# Patient Record
Sex: FEMALE | Race: WHITE | Employment: OTHER | URBAN - METROPOLITAN AREA
[De-identification: names, ages, dates, MRNs, and addresses within clinical notes are randomized per-mention and may not be internally consistent; named-entity substitution may affect disease eponyms.]

---

## 2017-03-02 DIAGNOSIS — I10 HTN, GOAL BELOW 140/90: ICD-10-CM

## 2017-03-02 DIAGNOSIS — R00.2 PALPITATIONS: ICD-10-CM

## 2017-03-02 NOTE — TELEPHONE ENCOUNTER
Metoprolol 25 mg      Last Written Prescription Date: 12/12/2016  Last Fill Quantity: 90, # refills: 0    Last Office Visit with [facility], [facility], [facility] [place] or [facility] prescribing provider:  12/13/2016   Future Office Visit:        BP Readings from Last 3 Encounters:   11/18/16 140/84   02/23/16 114/78   10/23/15 120/76       Losartan 50 mg      Last Written Prescription Date: 12/13/2016  Last Fill Quantity: 90, # refills: 0  Last Office Visit with [facility], [facility] [place] or [facility] prescribing provider: 11/18/2016       Potassium   Date Value Ref Range Status   11/18/2016 4.7 3.4 - 5.3 mmol/L Final     Creatinine   Date Value Ref Range Status   11/18/2016 0.97 0.52 - 1.04 mg/dL Final     BP Readings from Last 3 Encounters:   11/18/16 140/84   02/23/16 114/78   10/23/15 120/76

## 2017-03-03 RX ORDER — METOPROLOL SUCCINATE 25 MG/1
TABLET, EXTENDED RELEASE ORAL
Qty: 90 TABLET | Refills: 2 | Status: SHIPPED | OUTPATIENT
Start: 2017-03-03 | End: 2018-01-04

## 2017-03-03 RX ORDER — LOSARTAN POTASSIUM 50 MG/1
TABLET ORAL
Qty: 90 TABLET | Refills: 2 | Status: SHIPPED | OUTPATIENT
Start: 2017-03-03 | End: 2018-01-04

## 2017-03-03 NOTE — TELEPHONE ENCOUNTER
Prescription approved per [facility] Refill Protocol.  Sandra Cano, RN, BSN

## 2017-09-27 DIAGNOSIS — R00.2 PALPITATIONS: ICD-10-CM

## 2017-09-27 DIAGNOSIS — I10 HTN, GOAL BELOW 140/90: ICD-10-CM

## 2017-09-27 NOTE — TELEPHONE ENCOUNTER
Pending Prescriptions:                       Disp   Refills    metoprolol (TOPROL-XL) 25 MG 24 hr tablet*90 tab*2            Sig: TAKE 1 TABLET EVERY DAY    losartan (COZAAR) 50 MG tablet [Pharmacy *90 tab*2            Sig: TAKE 1 TABLET EVERY DAY          Last Written Prescription Date: 3/3/2017  Last Fill Quantity: 90, # refills: 2    Last Office Visit with [facility], [facility] or [facility] prescribing provider:  11/18/2016   Future Office Visit:        BP Readings from Last 3 Encounters:   11/18/16 140/84   02/23/16 114/78   10/23/15 120/76     Losartan 50mg      Last Written Prescription Date: 3/3/2017  Last Fill Quantity: 90, # refills: 2  Last Office Visit with [facility], [facility] or [facility] prescribing provider: 11/18/2016       Potassium   Date Value Ref Range Status   11/18/2016 4.7 3.4 - 5.3 mmol/L Final     Creatinine   Date Value Ref Range Status   11/18/2016 0.97 0.52 - 1.04 mg/dL Final     BP Readings from Last 3 Encounters:   11/18/16 140/84   02/23/16 114/78   10/23/15 120/76

## 2017-09-28 RX ORDER — LOSARTAN POTASSIUM 50 MG/1
TABLET ORAL
Qty: 90 TABLET | Refills: 2 | OUTPATIENT
Start: 2017-09-28

## 2017-09-28 RX ORDER — METOPROLOL SUCCINATE 25 MG/1
TABLET, EXTENDED RELEASE ORAL
Qty: 90 TABLET | Refills: 2 | OUTPATIENT
Start: 2017-09-28

## 2017-09-28 NOTE — TELEPHONE ENCOUNTER
Metoprolol  rx was filled on 3/3/2017 for a 3 month supply with 2 additional refills.  Pt should have enough through December.    Losartan  rx was filled on 3/3/2017 for a 3 month supply with 2 additional refills.  Pt should have enough through December.    Michelle Cotter RN

## 2017-10-23 NOTE — PROGRESS NOTES
SUBJECTIVE:                                                    Colleen Rowan is a 83 year old female who presents to clinic today for the following health issues:      HPI        Hospital Follow-up Visit:    Hospital/Nursing Home/ Rehab Facility: [facility]  Date of Admission: 10/18/17  Date of Discharge: 10/21/17  Reason(s) for Admission: Melena            Problems taking medications regularly:  None       Medication changes since discharge: Started: Pantoprazole        Problems adhering to non-medication therapy:  None    Summary of hospitalization:  CareEverywhere information obtained and reviewed  Diagnostic Tests/Treatments reviewed.  Follow up needed: none  Other Healthcare Providers Involved in Patient s Care:         None  Update since discharge: improved.     Post Discharge Medication Reconciliation: discharge medications reconciled, continue medications without change.  Plan of care communicated with patient     Coding guidelines for this visit:  Type of Medical   Decision Making Face-to-Face Visit       within 7 Days of discharge Face-to-Face Visit        within 14 days of discharge   Moderate Complexity [id number]   High Complexity [id number]              Problem list and histories reviewed & adjusted, as indicated.  Additional history: as documented        Patient Active Problem List   Diagnosis     Disorder of bone and cartilage     Ganglion     Senile osteoporosis     Advanced directives, counseling/discussion     CAD (coronary artery disease): abn EKG, with history of chest pain ca 1995     HTN, goal below 140/90     Respiratory abnormality     Back pain     Insomnia     Aneurysm of thoracic aorta (H)     Cystocele     Gastrointestinal hemorrhage associated with gastric ulcer     Past Surgical History:   Procedure Laterality Date     C LIGATE FALLOPIAN TUBE  age 22    tubal ligation     FUSION LUMBAR POSTERIOR ARTIFICIAL DISK WITH SYSTEM  08/14/2012    L-1,[facility]     HC EXCISION  TUMOR SOFT TISSUE NECK/THORAX IM <5CM  12/30/2003    Excision of intramuscular mass, right posterolateral chest.       Social History   Substance Use Topics     Smoking status: Never Smoker     Smokeless tobacco: Never Used     Alcohol use No     Family History   Problem Relation Age of Onset     Allergies Sister      latex     Eye Disorder Sister      cataracts     Arthritis Mother      Hypertension Mother      CEREBROVASCULAR DISEASE Mother 94     Eye Disorder Mother      cataracts     OSTEOPOROSIS Mother      kyphosis     CANCER Father      prostate     CEREBROVASCULAR DISEASE Father 80     Prostate Cancer Father      Depression Father      Neurologic Disorder Father      Parkinson's     CANCER Maternal Grandmother      uterine ca, and pelvic bone cancer     CEREBROVASCULAR DISEASE Maternal Grandmother      CEREBROVASCULAR DISEASE Maternal Grandfather      Asthma Daughter      Thyroid Disease Daughter      CANCER Son 48     non hodgkins lymphoma     C.A.D. No family hx of      DIABETES No family hx of      Breast Cancer No family hx of      Cancer - colorectal No family hx of      Alzheimer Disease No family hx of      Cardiovascular No family hx of      HEART DISEASE No family hx of      Circulatory No family hx of      GASTROINTESTINAL DISEASE No family hx of      Genitourinary Problems No family hx of      Lipids No family hx of      Musculoskeletal Disorder No family hx of      Respiratory No family hx of          Current Outpatient Prescriptions   Medication Sig Dispense Refill     traZODone (DESYREL) 50 MG tablet Take 1 tablet (50 mg) by mouth nightly as needed for sleep 30 tablet 1     pantoprazole (PROTONIX) 40 MG EC tablet Take 2 tablets (80 mg) by mouth daily 60 tablet 2     metoprolol (TOPROL-XL) 25 MG 24 hr tablet TAKE 1 TABLET EVERY DAY 90 tablet 2     losartan (COZAAR) 50 MG tablet TAKE 1 TABLET EVERY DAY 90 tablet 2     VITAMIN E NATURAL PO Take 400 Units by mouth daily       Multiple  "Vitamins-Minerals (OCUVITE PO) Take  by mouth.       calcium citrate (CALCITRATE) 950 MG tablet Take 1 tablet by mouth 2 times daily.       Ascorbic Acid (VITAMIN C PO) Take 1 tablet by mouth daily.       Cholecalciferol (VITAMIN D PO) Take  by mouth.       aspirin 81 MG EC tablet Take 1 tablet by mouth daily. (Patient not taking: Reported on 10/24/2017) 1 tablet 0     Allergies   Allergen Reactions     Penicillins      hives     BP Readings from Last 3 Encounters:   10/24/17 120/72   11/18/16 140/84   02/23/16 114/78    Wt Readings from Last 3 Encounters:   10/24/17 124 lb (56.2 kg)   11/18/16 127 lb (57.6 kg)   02/23/16 125 lb 9.6 oz (57 kg)                  Labs reviewed in EPIC        ROS:  Constitutional, HEENT, cardiovascular, pulmonary, GI, , musculoskeletal, neuro, skin, endocrine and psych systems are negative, except as otherwise noted.      OBJECTIVE:   /72 (BP Location: Left arm, Patient Position: Chair, Cuff Size: Adult Regular)  Pulse 96  Temp 97.7  F (36.5  C) (Oral)  Resp 16  Ht 5' 1.5\" (1.562 m)  Wt 124 lb (56.2 kg)  SpO2 98%  BMI 23.05 kg/m2  Body mass index is 23.05 kg/(m^2).   Physical Exam   Constitutional: She appears well-developed and well-nourished.   HENT:   Head: Normocephalic and atraumatic.   Cardiovascular: Normal rate, regular rhythm, normal heart sounds and intact distal pulses.    Pulmonary/Chest: Effort normal and breath sounds normal. No respiratory distress. She has no wheezes. She has no rales. She exhibits no tenderness.   Abdominal: Soft. Bowel sounds are normal. She exhibits no distension and no mass. There is no tenderness. There is no rebound and no guarding.   Neurological: She is alert.   Psychiatric: She has a normal mood and affect.         Diagnostic Test Results:  none     ASSESSMENT/PLAN:     Problem List Items Addressed This Visit     Insomnia     Has been using diphenhydramine for sleep and also has been taking some ibuprofen along with which could " have led to the ulcers  She wishes to try something else for insomnia  Trial trazodone         Relevant Medications    traZODone (DESYREL) 50 MG tablet    Aneurysm of thoracic aorta (H)     Saw CT surgeon in March 2017. Needs follow up in 2 yrs         Gastrointestinal hemorrhage associated with gastric ulcer - Primary     Hold aspirin for now  Continue take Pantoprazole for the next 3 months  Has endoscopy scheduled in next 2 months  No more melena.  Follow up in 3 months or sooner if any new symptoms         Relevant Medications    pantoprazole (PROTONIX) 40 MG EC tablet      Other Visit Diagnoses     Need for prophylactic vaccination and inoculation against influenza        Need for prophylactic vaccination against Streptococcus pneumoniae (pneumococcus)        Benign essential hypertension        Relevant Orders    COMPREHENSIVE METABOLIC PANEL       declines any shots       Heidi Inman MD  [facility]

## 2017-10-24 ENCOUNTER — OFFICE VISIT (OUTPATIENT)
Dept: FAMILY MEDICINE | Facility: OTHER | Age: 82
End: 2017-10-24
Payer: COMMERCIAL

## 2017-10-24 VITALS
TEMPERATURE: 97.7 F | OXYGEN SATURATION: 98 % | HEART RATE: 96 BPM | HEIGHT: 62 IN | SYSTOLIC BLOOD PRESSURE: 120 MMHG | DIASTOLIC BLOOD PRESSURE: 72 MMHG | BODY MASS INDEX: 22.82 KG/M2 | WEIGHT: 124 LBS | RESPIRATION RATE: 16 BRPM

## 2017-10-24 DIAGNOSIS — I25.10 CORONARY ARTERY DISEASE INVOLVING NATIVE HEART WITHOUT ANGINA PECTORIS, UNSPECIFIED VESSEL OR LESION TYPE: ICD-10-CM

## 2017-10-24 DIAGNOSIS — K25.4 GASTROINTESTINAL HEMORRHAGE ASSOCIATED WITH GASTRIC ULCER: Primary | ICD-10-CM

## 2017-10-24 DIAGNOSIS — I10 BENIGN ESSENTIAL HYPERTENSION: ICD-10-CM

## 2017-10-24 DIAGNOSIS — I71.20 THORACIC AORTIC ANEURYSM WITHOUT RUPTURE (H): ICD-10-CM

## 2017-10-24 DIAGNOSIS — I10 HTN, GOAL BELOW 140/90: ICD-10-CM

## 2017-10-24 DIAGNOSIS — G47.00 INSOMNIA, UNSPECIFIED TYPE: ICD-10-CM

## 2017-10-24 PROCEDURE — 99214 OFFICE O/P EST MOD 30 MIN: CPT | Performed by: FAMILY MEDICINE

## 2017-10-24 RX ORDER — PANTOPRAZOLE SODIUM 40 MG/1
2 TABLET, DELAYED RELEASE ORAL DAILY
COMMUNITY
Start: 2017-10-21 | End: 2017-10-24

## 2017-10-24 RX ORDER — TRAZODONE HYDROCHLORIDE 50 MG/1
50 TABLET, FILM COATED ORAL
Qty: 30 TABLET | Refills: 1 | Status: SHIPPED | OUTPATIENT
Start: 2017-10-24 | End: 2019-02-13

## 2017-10-24 RX ORDER — PANTOPRAZOLE SODIUM 40 MG/1
80 TABLET, DELAYED RELEASE ORAL DAILY
Qty: 60 TABLET | Refills: 2 | Status: SHIPPED | OUTPATIENT
Start: 2017-10-24 | End: 2018-05-01

## 2017-10-24 ASSESSMENT — PAIN SCALES - GENERAL: PAINLEVEL: NO PAIN (0)

## 2017-10-24 NOTE — NURSING NOTE
"Chief Complaint   Patient presents with     Hospital F/U     Health Maintenance     mychart, pneumovax, flu, height, fall risk       Initial /72 (BP Location: Left arm, Patient Position: Chair, Cuff Size: Adult Regular)  Pulse 96  Temp 97.7  F (36.5  C) (Oral)  Resp 16  Ht 5' 1.5\" (1.562 m)  Wt 124 lb (56.2 kg)  SpO2 98%  BMI 23.05 kg/m2 Estimated body mass index is 23.05 kg/(m^2) as calculated from the following:    Height as of this encounter: 5' 1.5\" (1.562 m).    Weight as of this encounter: 124 lb (56.2 kg).  Medication Reconciliation: complete   Chastity Pierce CMA (AAMA)      "

## 2017-10-24 NOTE — ASSESSMENT & PLAN NOTE
Has been using diphenhydramine for sleep and also has been taking some ibuprofen along with which could have led to the ulcers  She wishes to try something else for insomnia  Trial trazodone

## 2017-10-24 NOTE — ASSESSMENT & PLAN NOTE
Hold aspirin for now  Continue take Pantoprazole for the next 3 months  Has endoscopy scheduled in next 2 months  No more melena.  Follow up in 3 months or sooner if any new symptoms

## 2017-10-24 NOTE — MR AVS SNAPSHOT
"              After Visit Summary   10/24/2017    Colleen Rowan    MRN: [id number]           Patient Information     Date Of Birth          1/6/1934        Visit Information        Provider Department      10/24/2017 2:00 PM Heidi Inman MD [facility]        Today's Diagnoses     Gastrointestinal hemorrhage associated with gastric ulcer    -  1    Insomnia, unspecified type        Benign essential hypertension        Thoracic aortic aneurysm without rupture (H)        HTN, goal below 140/90        Coronary artery disease involving native heart without angina pectoris, unspecified vessel or lesion type           Follow-ups after your visit        Who to contact     If you have questions or need follow up information about today's clinic visit or your schedule please contact [facility] directly at [phone].  Normal or non-critical lab and imaging results will be communicated to you by MyChart, letter or phone within 4 business days after the clinic has received the results. If you do not hear from us within 7 days, please contact the clinic through MyChart or phone. If you have a critical or abnormal lab result, we will notify you by phone as soon as possible.  Submit refill requests through [employer] or call your pharmacy and they will forward the refill request to us. Please allow 3 business days for your refill to be completed.          Additional Information About Your Visit        [employer]ift[employer]ar[employer]elaGet Information     [employer] lets you send messages to your doctor, view your test results, renew your prescriptions, schedule appointments and more. To sign up, go to [email or web address] . Click on \"Log in\" on the left side of the screen, which will take you to the Welcome page. Then click on \"Sign up Now\" on the right side of the page.     You will be asked to enter the access code listed below, as well as some personal information. Please follow the directions to create your username " "and password.     Your access code is: [email or web address]  Expires: 2018  3:04 PM     Your access code will  in 90 days. If you need help or a new code, please call your [facility] clinic or [phone].        Care EveryWhere ID     This is your Care EveryWhere ID. This could be used by other organizations to access your [facility] medical records  [id number]        Your Vitals Were     Pulse Temperature Respirations Height Pulse Oximetry BMI (Body Mass Index)    96 97.7  F (36.5  C) (Oral) 16 5' 1.5\" (1.562 m) 98% 23.05 kg/m2       Blood Pressure from Last 3 Encounters:   10/24/17 120/72   16 140/84   16 114/78    Weight from Last 3 Encounters:   10/24/17 124 lb (56.2 kg)   16 127 lb (57.6 kg)   16 125 lb 9.6 oz (57 kg)              We Performed the Following     COMPREHENSIVE METABOLIC PANEL          Today's Medication Changes          These changes are accurate as of: 10/24/17  3:04 PM.  If you have any questions, ask your nurse or doctor.               Start taking these medicines.        Dose/Directions    traZODone 50 MG tablet   Commonly known as:  DESYREL   Used for:  Insomnia, unspecified type   Started by:  Heidi Inman MD        Dose:  50 mg   Take 1 tablet (50 mg) by mouth nightly as needed for sleep   Quantity:  30 tablet   Refills:  1            Where to get your medicines      These medications were sent to [facility] [address] [place] [id number] [address] [address]     Phone:  [phone]     pantoprazole 40 MG EC tablet    traZODone 50 MG tablet                Primary Care Provider Office Phone # Fax #    Heidi Inman [phone]       [address] [place] [id number]        Equal Access to Services     [facility] HospitalANNETTE AH: Ninfa shrestha Sorochelle, waaxda luqadaha, qaybta simón li. [facility] [phone].    ATENCIÓN: Si blanco coto a cheung " disposición servicios gratuitos de asistencia lingüística. Lisa gregg [phone].    We comply with applicable federal civil rights laws and [place] laws. We do not discriminate on the basis of race, color, national origin, age, disability, sex, sexual orientation, or gender identity.            Thank you!     Thank you for choosing [facility]  for your care. Our goal is always to provide you with excellent care. Hearing back from our patients is one way we can continue to improve our services. Please take a few minutes to complete the written survey that you may receive in the mail after your visit with us. Thank you!             Your Updated Medication List - Protect others around you: Learn how to safely use, store and throw away your medicines at www.disposemymeds.org.          This list is accurate as of: 10/24/17  3:04 PM.  Always use your most recent med list.                   Brand Name Dispense Instructions for use Diagnosis    aspirin 81 MG EC tablet     1 tablet    Take 1 tablet by mouth daily.        calcium citrate 950 MG tablet    CALCITRATE     Take 1 tablet by mouth 2 times daily.        losartan 50 MG tablet    COZAAR    90 tablet    TAKE 1 TABLET EVERY DAY    HTN, goal below 140/90       metoprolol 25 MG 24 hr tablet    TOPROL-XL    90 tablet    TAKE 1 TABLET EVERY DAY    Palpitations       OCUVITE PO      Take  by mouth.        pantoprazole 40 MG EC tablet    PROTONIX    60 tablet    Take 2 tablets (80 mg) by mouth daily    Gastrointestinal hemorrhage associated with gastric ulcer       traZODone 50 MG tablet    DESYREL    30 tablet    Take 1 tablet (50 mg) by mouth nightly as needed for sleep    Insomnia, unspecified type       VITAMIN C PO      Take 1 tablet by mouth daily.        VITAMIN D PO      Take  by mouth.        VITAMIN E NATURAL PO      Take 400 Units by mouth daily

## 2017-12-28 ENCOUNTER — TRANSFERRED RECORDS (OUTPATIENT)
Dept: HEALTH INFORMATION MANAGEMENT | Facility: CLINIC | Age: 82
End: 2017-12-28

## 2018-01-04 DIAGNOSIS — R00.2 PALPITATIONS: ICD-10-CM

## 2018-01-04 DIAGNOSIS — I10 HTN, GOAL BELOW 140/90: ICD-10-CM

## 2018-01-05 RX ORDER — METOPROLOL SUCCINATE 25 MG/1
TABLET, EXTENDED RELEASE ORAL
Qty: 90 TABLET | Refills: 2 | Status: SHIPPED | OUTPATIENT
Start: 2018-01-05 | End: 2018-08-06

## 2018-01-05 RX ORDER — LOSARTAN POTASSIUM 50 MG/1
TABLET ORAL
Qty: 30 TABLET | Refills: 0 | Status: SHIPPED | OUTPATIENT
Start: 2018-01-05 | End: 2018-01-19

## 2018-01-05 NOTE — TELEPHONE ENCOUNTER
Requested Prescriptions   Pending Prescriptions Disp Refills     metoprolol (TOPROL-XL) 25 MG 24 hr tablet [Pharmacy Med Name: METOPROLOL SUCCINATE ER 25 MG Tablet Extended Release 24 Hour] 90 tablet 2     Sig: TAKE 1 TABLET EVERY DAY    Beta-Blockers Protocol Passed    1/4/2018  1:38 PM       Passed - Blood pressure under 140/90    BP Readings from Last 3 Encounters:   10/24/17 120/72   11/18/16 140/84   02/23/16 114/78                Passed - Patient is age 6 or older       Passed - Recent or future visit with authorizing provider's specialty    Patient had office visit in the last year or has a visit in the next 30 days with authorizing provider.  See chart review.     Last OV: 10/24/17         Prescription approved per [facility] Refill Protocol.    Yamila Aggarwal RN, BSN               losartan (COZAAR) 50 MG tablet [Pharmacy Med Name: LOSARTAN POTASSIUM 50 MG Tablet] 90 tablet 2     Sig: TAKE 1 TABLET EVERY DAY    Angiotensin-II Receptors Failed    1/4/2018  1:38 PM       Failed - Normal serum creatinine on file in past 12 months    Recent Labs   Lab Test  11/18/16   1011   CR  0.97            Failed - Normal serum potassium on file in past 12 months    Recent Labs   Lab Test  11/18/16   1011   POTASSIUM  4.7                   Passed - Blood pressure under 140/90 in past 12 months.    BP Readings from Last 3 Encounters:   10/24/17 120/72   11/18/16 140/84   02/23/16 114/78                Passed - Recent or future visit with authorizing provider's specialty    Patient had office visit in the last year or has a visit in the next 30 days with authorizing provider.  See chart review.     Last OV: 10/24/17         Passed - Patient is age 18 or older       Passed - No active pregnancy on record       Passed - No positive pregnancy test in past 12 months        Routing refill request to provider for review/approval because:  Labs not current:  Serum potassium and serum creatinine    Yamila Aggarwal RN, BSN

## 2018-01-11 DIAGNOSIS — I10 HTN, GOAL BELOW 140/90: ICD-10-CM

## 2018-01-15 RX ORDER — LOSARTAN POTASSIUM 50 MG/1
TABLET ORAL
Qty: 90 TABLET | Refills: 2 | OUTPATIENT
Start: 2018-01-15

## 2018-01-15 NOTE — TELEPHONE ENCOUNTER
Routing refill request to provider for review/approval because:  Keyla given x1 and patient did not follow up, please advise  Per Dr. Healy on 1/5/18: Patient needs an appointment before further refills    Michelle Cotter RN

## 2018-01-16 NOTE — TELEPHONE ENCOUNTER
Notified pt, she will call back and schedule, she wants to make sure the weather is better before scheduling.

## 2018-01-17 NOTE — PROGRESS NOTES
SUBJECTIVE:                                                    Colleen Rowan is a 84 year old female who presents to clinic today for the following health issues:      History of Present Illness     Diet:  Regular (no restrictions)  Frequency of exercise:  6-7 days/week  Duration of exercise:  Greater than 60 minutes  Taking medications regularly:  Yes  Medication side effects:  None  Additional concerns today:  No    Answers for HPI/ROS submitted by the patient on 1/19/2018   PHQ-2 Score: 0    Hypertension Follow-up      Outpatient blood pressures are not being checked.    Low Salt Diet: no added salt    She is also here to discuss results of her recent endoscopy done in December 2017      Problem list and histories reviewed & adjusted, as indicated.  Additional history: as documented        Patient Active Problem List   Diagnosis     Disorder of bone and cartilage     Ganglion     Senile osteoporosis     Advanced directives, counseling/discussion     CAD (coronary artery disease): abn EKG, with history of chest pain ca 1995     HTN, goal below 140/90     Respiratory abnormality     Back pain     Insomnia     Aneurysm of thoracic aorta (H)     Cystocele     Gastrointestinal hemorrhage associated with gastric ulcer     Past Surgical History:   Procedure Laterality Date     C LIGATE FALLOPIAN TUBE  age 22    tubal ligation     FUSION LUMBAR POSTERIOR ARTIFICIAL DISK WITH SYSTEM  08/14/2012    L-1,[facility] EXCISION TUMOR SOFT TISSUE NECK/THORAX IM <5CM  12/30/2003    Excision of intramuscular mass, right posterolateral chest.       Social History   Substance Use Topics     Smoking status: Never Smoker     Smokeless tobacco: Never Used     Alcohol use No     Family History   Problem Relation Age of Onset     Allergies Sister      latex     Eye Disorder Sister      cataracts     Arthritis Mother      Hypertension Mother      CEREBROVASCULAR DISEASE Mother 94     Eye Disorder Mother      cataracts      OSTEOPOROSIS Mother      kyphosis     CANCER Father      prostate     CEREBROVASCULAR DISEASE Father 80     Prostate Cancer Father      Depression Father      Neurologic Disorder Father      Parkinson's     CANCER Maternal Grandmother      uterine ca, and pelvic bone cancer     CEREBROVASCULAR DISEASE Maternal Grandmother      CEREBROVASCULAR DISEASE Maternal Grandfather      Asthma Daughter      Thyroid Disease Daughter      CANCER Son 48     non hodgkins lymphoma     C.A.D. No family hx of      DIABETES No family hx of      Breast Cancer No family hx of      Cancer - colorectal No family hx of      Alzheimer Disease No family hx of      Cardiovascular No family hx of      HEART DISEASE No family hx of      Circulatory No family hx of      GASTROINTESTINAL DISEASE No family hx of      Genitourinary Problems No family hx of      Lipids No family hx of      Musculoskeletal Disorder No family hx of      Respiratory No family hx of          Current Outpatient Prescriptions   Medication Sig Dispense Refill     omeprazole (PRILOSEC) 20 MG CR capsule Take 1 capsule by mouth daily       losartan (COZAAR) 50 MG tablet Take 1 tablet (50 mg) by mouth daily 90 tablet 3     metoprolol (TOPROL-XL) 25 MG 24 hr tablet TAKE 1 TABLET EVERY DAY 90 tablet 2     traZODone (DESYREL) 50 MG tablet Take 1 tablet (50 mg) by mouth nightly as needed for sleep 30 tablet 1     VITAMIN E NATURAL PO Take 400 Units by mouth daily       Multiple Vitamins-Minerals (OCUVITE PO) Take  by mouth.       calcium citrate (CALCITRATE) 950 MG tablet Take 1 tablet by mouth 2 times daily.       aspirin 81 MG EC tablet Take 1 tablet by mouth daily. 1 tablet 0     Ascorbic Acid (VITAMIN C PO) Take 1 tablet by mouth daily.       Cholecalciferol (VITAMIN D PO) Take  by mouth.       [DISCONTINUED] losartan (COZAAR) 50 MG tablet Take 1 tablet (50 mg) by mouth daily 90 tablet 3     [DISCONTINUED] losartan (COZAAR) 50 MG tablet TAKE 1 TABLET EVERY DAY 30 tablet 0      "pantoprazole (PROTONIX) 40 MG EC tablet Take 2 tablets (80 mg) by mouth daily (Patient not taking: Reported on 1/19/2018) 60 tablet 2     Allergies   Allergen Reactions     Penicillins      hives     Recent Labs   Lab Test  11/18/16   1011  10/23/15   1052   11/05/14   0925   09/03/13   0827   LDL   --   95   --   57   --   70   HDL   --   68   --   70   --   62   TRIG   --   70   --   89   --   89   ALT  19  22   --   20   --    --    CR  0.97  0.72   --   0.87   < >   --    GFRESTIMATED  55*  78   < >  62   < >   --    GFRESTBLACK  66  >90   GFR Calc     < >  75   < >   --    POTASSIUM  4.7  4.4   --   4.1   < >   --    TSH   --    --    --    --    --   3.63    < > = values in this interval not displayed.      BP Readings from Last 3 Encounters:   01/19/18 122/74   10/24/17 120/72   11/18/16 140/84    Wt Readings from Last 3 Encounters:   01/19/18 121 lb (54.9 kg)   10/24/17 124 lb (56.2 kg)   11/18/16 127 lb (57.6 kg)                  Labs reviewed in EPIC          ROS:  Constitutional, HEENT, cardiovascular, pulmonary, GI, , musculoskeletal, neuro, skin, endocrine and psych systems are negative, except as otherwise noted.      OBJECTIVE:   /74 (BP Location: Left arm, Patient Position: Chair, Cuff Size: Adult Regular)  Pulse 71  Temp 97.7  F (36.5  C) (Oral)  Resp 16  Ht 5' 1.42\" (1.56 m)  Wt 121 lb (54.9 kg)  SpO2 100%  BMI 22.55 kg/m2  Body mass index is 22.55 kg/(m^2).   Physical Exam   Constitutional: She is oriented to person, place, and time. She appears well-developed and well-nourished.   HENT:   Head: Normocephalic and atraumatic.   Cardiovascular: Normal rate, regular rhythm and normal heart sounds.    Pulmonary/Chest: Effort normal and breath sounds normal.   Abdominal: Soft. Bowel sounds are normal. She exhibits no distension and no mass. There is no tenderness. There is no rebound and no guarding.   Neurological: She is alert and oriented to person, place, and time. "   Psychiatric: She has a normal mood and affect.         Diagnostic Test Results:  none     ASSESSMENT/PLAN:     Problem List Items Addressed This Visit     HTN, goal below 140/90     Blood pressures are well controlled.  Continue losartan at the current dose.  She is due to get her chemistries redrawn.  Recheck labs today.  Refills sent for the next year.  Follow-up in 1 year.         Relevant Medications    losartan (COZAAR) 50 MG tablet    Other Relevant Orders    COMPREHENSIVE METABOLIC PANEL (Completed)    Gastrointestinal hemorrhage associated with gastric ulcer     I reviewed the results of her most recent endoscopy done in December.  This was done as a follow-up on her gastric ulcers that were initially diagnosed in October last year after acute GI bleed.  She was subsequently placed on pantoprazole and was advised to have a follow-up endoscopy in couple of months.  The results showed that she still had a couple of nearly healed gastric ulcers.  I did agree with continuing proton pump inhibitors for now.  She reports that she is running low on this medication and will call back with the right dose of medication that she has been taking so I can send a refill on this.  I will recheck her hemoglobin levels to look for stability.             Other Visit Diagnoses     Anemia, unspecified type    -  Primary    Relevant Orders    CBC with platelets (Completed)               Heidi Inman MD  [facility]

## 2018-01-19 ENCOUNTER — TELEPHONE (OUTPATIENT)
Dept: FAMILY MEDICINE | Facility: OTHER | Age: 83
End: 2018-01-19

## 2018-01-19 ENCOUNTER — OFFICE VISIT (OUTPATIENT)
Dept: FAMILY MEDICINE | Facility: OTHER | Age: 83
End: 2018-01-19
Payer: COMMERCIAL

## 2018-01-19 VITALS
TEMPERATURE: 97.7 F | BODY MASS INDEX: 22.84 KG/M2 | DIASTOLIC BLOOD PRESSURE: 74 MMHG | OXYGEN SATURATION: 100 % | SYSTOLIC BLOOD PRESSURE: 122 MMHG | HEART RATE: 71 BPM | HEIGHT: 61 IN | WEIGHT: 121 LBS | RESPIRATION RATE: 16 BRPM

## 2018-01-19 DIAGNOSIS — I10 HTN, GOAL BELOW 140/90: ICD-10-CM

## 2018-01-19 DIAGNOSIS — K25.4 GASTROINTESTINAL HEMORRHAGE ASSOCIATED WITH GASTRIC ULCER: Primary | ICD-10-CM

## 2018-01-19 DIAGNOSIS — D64.9 ANEMIA, UNSPECIFIED TYPE: Primary | ICD-10-CM

## 2018-01-19 DIAGNOSIS — K25.4 GASTROINTESTINAL HEMORRHAGE ASSOCIATED WITH GASTRIC ULCER: ICD-10-CM

## 2018-01-19 LAB
ALBUMIN SERPL-MCNC: 3.9 G/DL (ref 3.4–5)
ALP SERPL-CCNC: 69 U/L (ref 40–150)
ALT SERPL W P-5'-P-CCNC: 18 U/L (ref 0–50)
ANION GAP SERPL CALCULATED.3IONS-SCNC: 7 MMOL/L (ref 3–14)
AST SERPL W P-5'-P-CCNC: 10 U/L (ref 0–45)
BILIRUB SERPL-MCNC: 0.5 MG/DL (ref 0.2–1.3)
BUN SERPL-MCNC: 23 MG/DL (ref 7–30)
CALCIUM SERPL-MCNC: 9.1 MG/DL (ref 8.5–10.1)
CHLORIDE SERPL-SCNC: 105 MMOL/L (ref 94–109)
CO2 SERPL-SCNC: 28 MMOL/L (ref 20–32)
CREAT SERPL-MCNC: 0.78 MG/DL (ref 0.52–1.04)
ERYTHROCYTE [DISTWIDTH] IN BLOOD BY AUTOMATED COUNT: 15.5 % (ref 10–15)
GFR SERPL CREATININE-BSD FRML MDRD: 71 ML/MIN/1.7M2
GLUCOSE SERPL-MCNC: 99 MG/DL (ref 70–99)
HCT VFR BLD AUTO: 36.9 % (ref 35–47)
HGB BLD-MCNC: 11.6 G/DL (ref 11.7–15.7)
MCH RBC QN AUTO: 27.6 PG (ref 26.5–33)
MCHC RBC AUTO-ENTMCNC: 31.4 G/DL (ref 31.5–36.5)
MCV RBC AUTO: 88 FL (ref 78–100)
PLATELET # BLD AUTO: 204 10E9/L (ref 150–450)
POTASSIUM SERPL-SCNC: 4.9 MMOL/L (ref 3.4–5.3)
PROT SERPL-MCNC: 7 G/DL (ref 6.8–8.8)
RBC # BLD AUTO: 4.21 10E12/L (ref 3.8–5.2)
SODIUM SERPL-SCNC: 140 MMOL/L (ref 133–144)
WBC # BLD AUTO: 6.9 10E9/L (ref 4–11)

## 2018-01-19 PROCEDURE — 99214 OFFICE O/P EST MOD 30 MIN: CPT | Performed by: FAMILY MEDICINE

## 2018-01-19 PROCEDURE — 80053 COMPREHEN METABOLIC PANEL: CPT | Performed by: FAMILY MEDICINE

## 2018-01-19 PROCEDURE — 85027 COMPLETE CBC AUTOMATED: CPT | Performed by: FAMILY MEDICINE

## 2018-01-19 PROCEDURE — 36415 COLL VENOUS BLD VENIPUNCTURE: CPT | Performed by: FAMILY MEDICINE

## 2018-01-19 RX ORDER — LOSARTAN POTASSIUM 50 MG/1
50 TABLET ORAL DAILY
Qty: 90 TABLET | Refills: 3 | Status: SHIPPED | OUTPATIENT
Start: 2018-01-19 | End: 2019-02-13

## 2018-01-19 RX ORDER — LOSARTAN POTASSIUM 50 MG/1
50 TABLET ORAL DAILY
Qty: 90 TABLET | Refills: 3 | Status: SHIPPED | OUTPATIENT
Start: 2018-01-19 | End: 2018-01-19

## 2018-01-19 ASSESSMENT — PAIN SCALES - GENERAL: PAINLEVEL: NO PAIN (0)

## 2018-01-19 NOTE — NURSING NOTE
"Chief Complaint   Patient presents with     Recheck Medication     Losartan     Panel Management     Flu, Pneumo, CMP       Initial /74 (BP Location: Left arm, Patient Position: Chair, Cuff Size: Adult Regular)  Pulse 71  Temp 97.7  F (36.5  C) (Oral)  Resp 16  Ht 5' 1.42\" (1.56 m)  Wt 121 lb (54.9 kg)  SpO2 100%  BMI 22.55 kg/m2 Estimated body mass index is 22.55 kg/(m^2) as calculated from the following:    Height as of this encounter: 5' 1.42\" (1.56 m).    Weight as of this encounter: 121 lb (54.9 kg).  Medication Reconciliation: complete    "

## 2018-01-19 NOTE — TELEPHONE ENCOUNTER
Colleen said that she is on Omeprazole and she was wondering if you could call in 20 mg for her to the [facility] mail order? Along with her other Rx's. Thank you!

## 2018-01-19 NOTE — MR AVS SNAPSHOT
"              After Visit Summary   1/19/2018    Colleen Rowan    MRN: [id number]           Patient Information     Date Of Birth          1/6/1934        Visit Information        Provider Department      1/19/2018 9:00 AM Heidi Inman MD [facility]        Today's Diagnoses     Anemia, unspecified type    -  1    Need for prophylactic vaccination and inoculation against influenza        Need for prophylactic vaccination against Streptococcus pneumoniae (pneumococcus)        HTN, goal below 140/90           Follow-ups after your visit        Follow-up notes from your care team     Return in about 6 months (around 7/19/2018).      Who to contact     If you have questions or need follow up information about today's clinic visit or your schedule please contact [facility] directly at [phone].  Normal or non-critical lab and imaging results will be communicated to you by MyChart, letter or phone within 4 business days after the clinic has received the results. If you do not hear from us within 7 days, please contact the clinic through MyChart or phone. If you have a critical or abnormal lab result, we will notify you by phone as soon as possible.  Submit refill requests through [employer] or call your pharmacy and they will forward the refill request to us. Please allow 3 business days for your refill to be completed.          Additional Information About Your Visit        [employer] Information     [employer] lets you send messages to your doctor, view your test results, renew your prescriptions, schedule appointments and more. To sign up, go to [email or web address] . Click on \"Log in\" on the left side of the screen, which will take you to the Welcome page. Then click on \"Sign up Now\" on the right side of the page.     You will be asked to enter the access code listed below, as well as some personal information. Please follow the directions to create your username and password.   " "  Your access code is: [email or web address]  Expires: 2018  2:04 PM     Your access code will  in 90 days. If you need help or a new code, please call your [facility] or [phone].        Care EveryWhere ID     This is your Care EveryWhere ID. This could be used by other organizations to access your [facility] medical records  [id number]        Your Vitals Were     Pulse Temperature Respirations Height Pulse Oximetry BMI (Body Mass Index)    71 97.7  F (36.5  C) (Oral) 16 5' 1.42\" (1.56 m) 100% 22.55 kg/m2       Blood Pressure from Last 3 Encounters:   18 122/74   10/24/17 120/72   16 140/84    Weight from Last 3 Encounters:   18 121 lb (54.9 kg)   10/24/17 124 lb (56.2 kg)   16 127 lb (57.6 kg)              We Performed the Following     CBC with platelets     COMPREHENSIVE METABOLIC PANEL          Today's Medication Changes          These changes are accurate as of: 18  9:18 AM.  If you have any questions, ask your nurse or doctor.               Start taking these medicines.        Dose/Directions    losartan 50 MG tablet   Commonly known as:  COZAAR   Used for:  HTN, goal below 140/90   Started by:  Heidi Inman MD        Dose:  50 mg   Take 1 tablet (50 mg) by mouth daily   Quantity:  90 tablet   Refills:  3            Where to get your medicines      These medications were sent to [facility] - [facility] [address]  [address] [address]     Phone:  [phone]     losartan 50 MG tablet                Primary Care Provider Office Phone # Fax #    Heidi Inman [phone]       [address]  [place] [address]        Equal Access to Services     [facility] Hospital[facility]NETTE AH: Ninfa Brooks, waaxda luqadaha, qaybta kaalmada gabrielle, simón nunez. So [facility] [phone].    ATENCIÓN: Si habla español, tiene a cheung disposición servicios gratuitos de asistencia " lingüística. Lisa al [phone].    We comply with applicable federal civil rights laws and [place] laws. We do not discriminate on the basis of race, color, national origin, age, disability, sex, sexual orientation, or gender identity.            Thank you!     Thank you for choosing [facility]  for your care. Our goal is always to provide you with excellent care. Hearing back from our patients is one way we can continue to improve our services. Please take a few minutes to complete the written survey that you may receive in the mail after your visit with us. Thank you!             Your Updated Medication List - Protect others around you: Learn how to safely use, store and throw away your medicines at www.disposemymeds.org.          This list is accurate as of: 1/19/18  9:18 AM.  Always use your most recent med list.                   Brand Name Dispense Instructions for use Diagnosis    aspirin 81 MG EC tablet     1 tablet    Take 1 tablet by mouth daily.        calcium citrate 950 MG tablet    CALCITRATE     Take 1 tablet by mouth 2 times daily.        losartan 50 MG tablet    COZAAR    90 tablet    Take 1 tablet (50 mg) by mouth daily    HTN, goal below 140/90       metoprolol succinate 25 MG 24 hr tablet    TOPROL-XL    90 tablet    TAKE 1 TABLET EVERY DAY    Palpitations       OCUVITE PO      Take  by mouth.        omeprazole 20 MG CR capsule    priLOSEC     Take 1 capsule by mouth daily        pantoprazole 40 MG EC tablet    PROTONIX    60 tablet    Take 2 tablets (80 mg) by mouth daily    Gastrointestinal hemorrhage associated with gastric ulcer       traZODone 50 MG tablet    DESYREL    30 tablet    Take 1 tablet (50 mg) by mouth nightly as needed for sleep    Insomnia, unspecified type       VITAMIN C PO      Take 1 tablet by mouth daily.        VITAMIN D PO      Take  by mouth.        VITAMIN E NATURAL PO      Take 400 Units by mouth daily

## 2018-01-19 NOTE — ASSESSMENT & PLAN NOTE
I reviewed the results of her most recent endoscopy done in December.  This was done as a follow-up on her gastric ulcers that were initially diagnosed in October last year after acute GI bleed.  She was subsequently placed on pantoprazole and was advised to have a follow-up endoscopy in couple of months.  The results showed that she still had a couple of nearly healed gastric ulcers.  I did agree with continuing proton pump inhibitors for now.  She reports that she is running low on this medication and will call back with the right dose of medication that she has been taking so I can send a refill on this.  I will recheck her hemoglobin levels to look for stability.

## 2018-01-19 NOTE — ASSESSMENT & PLAN NOTE
Blood pressures are well controlled.  Continue losartan at the current dose.  She is due to get her chemistries redrawn.  Recheck labs today.  Refills sent for the next year.  Follow-up in 1 year.

## 2018-03-26 DIAGNOSIS — K25.4 GASTROINTESTINAL HEMORRHAGE ASSOCIATED WITH GASTRIC ULCER: ICD-10-CM

## 2018-03-29 NOTE — TELEPHONE ENCOUNTER
"Requested Prescriptions   Pending Prescriptions Disp Refills     omeprazole (PRILOSEC) 20 MG CR capsule [Pharmacy Med Name: OMEPRAZOLE 20 MG Capsule Delayed Release] 90 capsule 0     Sig: TAKE 1 CAPSULE EVERY DAY    PPI Protocol Failed    3/27/2018  3:45 PM       Failed - No diagnosis of osteoporosis on record       Passed - Not on Clopidogrel (unless Pantoprazole ordered)       Passed - Recent (12 mo) or future (30 days) visit within the authorizing provider's specialty    Patient had office visit in the last 12 months or has a visit in the next 30 days with authorizing provider or within the authorizing provider's specialty.  See \"Patient Info\" tab in inbasket, or \"Choose Columns\" in Meds & Orders section of the refill encounter.           Passed - Patient is age 18 or older       Passed - No active pregnacy on record       Passed - No positive pregnancy test in past 12 months        Routing refill request to provider for review/approval because:  Pt has osteoporosis on her problem list.    Michelle Cotter RN          "

## 2018-04-30 NOTE — PROGRESS NOTES
SUBJECTIVE:   Colleen Rowan is a 84 year old female who presents to clinic today for the following health issues:      HPI  Medication Followup of Omeprazole    Taking Medication as prescribed: yes    Side Effects:  None    Medication Helping Symptoms:  yes     Problem list and histories reviewed & adjusted, as indicated.  Additional history: as documented        Patient Active Problem List   Diagnosis     Disorder of bone and cartilage     Ganglion     Senile osteoporosis     Advanced directives, counseling/discussion     CAD (coronary artery disease): abn EKG, with history of chest pain ca 1995     HTN, goal below 140/90     Respiratory abnormality     Back pain     Insomnia     Aneurysm of thoracic aorta (H)     Cystocele     Gastrointestinal hemorrhage associated with gastric ulcer     Past Surgical History:   Procedure Laterality Date     C LIGATE FALLOPIAN TUBE  age 22    tubal ligation     FUSION LUMBAR POSTERIOR ARTIFICIAL DISK WITH SYSTEM  08/14/2012    L-1,[facility] EXCISION TUMOR SOFT TISSUE NECK/THORAX IM <5CM  12/30/2003    Excision of intramuscular mass, right posterolateral chest.       Social History   Substance Use Topics     Smoking status: Never Smoker     Smokeless tobacco: Never Used     Alcohol use No     Family History   Problem Relation Age of Onset     Allergies Sister      latex     Eye Disorder Sister      cataracts     Arthritis Mother      Hypertension Mother      CEREBROVASCULAR DISEASE Mother 94     Eye Disorder Mother      cataracts     OSTEOPOROSIS Mother      kyphosis     CANCER Father      prostate     CEREBROVASCULAR DISEASE Father 80     Prostate Cancer Father      Depression Father      Neurologic Disorder Father      Parkinson's     CANCER Maternal Grandmother      uterine ca, and pelvic bone cancer     CEREBROVASCULAR DISEASE Maternal Grandmother      CEREBROVASCULAR DISEASE Maternal Grandfather      Asthma Daughter      Thyroid Disease Daughter      CANCER  Son 48     non hodgkins lymphoma     C.A.D. No family hx of      DIABETES No family hx of      Breast Cancer No family hx of      Cancer - colorectal No family hx of      Alzheimer Disease No family hx of      Cardiovascular No family hx of      HEART DISEASE No family hx of      Circulatory No family hx of      GASTROINTESTINAL DISEASE No family hx of      Genitourinary Problems No family hx of      Lipids No family hx of      Musculoskeletal Disorder No family hx of      Respiratory No family hx of          Current Outpatient Prescriptions   Medication Sig Dispense Refill     Ascorbic Acid (VITAMIN C PO) Take 1 tablet by mouth daily.       aspirin 81 MG EC tablet Take 1 tablet by mouth daily. 1 tablet 0     calcium citrate (CALCITRATE) 950 MG tablet Take 1 tablet by mouth 2 times daily.       Cholecalciferol (VITAMIN D PO) Take  by mouth.       losartan (COZAAR) 50 MG tablet Take 1 tablet (50 mg) by mouth daily 90 tablet 3     metoprolol (TOPROL-XL) 25 MG 24 hr tablet TAKE 1 TABLET EVERY DAY 90 tablet 2     Multiple Vitamins-Minerals (OCUVITE PO) Take  by mouth.       omeprazole (PRILOSEC) 20 MG CR capsule TAKE 1 CAPSULE EVERY DAY 90 capsule 3     traZODone (DESYREL) 50 MG tablet Take 1 tablet (50 mg) by mouth nightly as needed for sleep 30 tablet 1     VITAMIN E NATURAL PO Take 400 Units by mouth daily       Allergies   Allergen Reactions     Penicillins      hives     No Clinical Screening - See Comments Rash     BP Readings from Last 3 Encounters:   05/01/18 118/76   01/19/18 122/74   10/24/17 120/72    Wt Readings from Last 3 Encounters:   05/01/18 117 lb (53.1 kg)   01/19/18 121 lb (54.9 kg)   10/24/17 124 lb (56.2 kg)                  Labs reviewed in EPIC    ROS:  Constitutional, HEENT, cardiovascular, pulmonary, GI, , musculoskeletal, neuro, skin, endocrine and psych systems are negative, except as otherwise noted.    OBJECTIVE:     /76 (BP Location: Right arm, Patient Position: Chair, Cuff Size:  "Adult Small)  Pulse 87  Temp 98.2  F (36.8  C) (Temporal)  Resp 16  Ht 5' 1.42\" (1.56 m)  Wt 117 lb (53.1 kg)  SpO2 100%  BMI 21.81 kg/m2  Body mass index is 21.81 kg/(m^2).   Physical Exam   Constitutional: She is oriented to person, place, and time. She appears well-developed and well-nourished.   HENT:   Head: Normocephalic and atraumatic.   Cardiovascular: Normal rate, regular rhythm, normal heart sounds and intact distal pulses.  Exam reveals no gallop.    No murmur heard.  Pulmonary/Chest: Effort normal and breath sounds normal.   Neurological: She is alert and oriented to person, place, and time.   Psychiatric: She has a normal mood and affect.         Diagnostic Test Results:  none     ASSESSMENT/PLAN:     Problem List Items Addressed This Visit     Gastrointestinal hemorrhage associated with gastric ulcer - Primary     Recheck on her CBC shows normal hemoglobin levels currently.  I advised her to continue to take the omeprazole twice a day as she has been taking in the past.  When she is out of the current prescription I advised her to cut back on the dose to once a day.  Will send a new prescription when she is running low for a 3 month supply.  Can switch to an H2 blocker after a year since her last GI bleed.  Patient is agreeable to the above plan.         Relevant Orders    CBC with platelets (Completed)               Heidi Inman MD  [facility]  "

## 2018-05-01 ENCOUNTER — TELEPHONE (OUTPATIENT)
Dept: FAMILY MEDICINE | Facility: OTHER | Age: 83
End: 2018-05-01

## 2018-05-01 ENCOUNTER — OFFICE VISIT (OUTPATIENT)
Dept: FAMILY MEDICINE | Facility: OTHER | Age: 83
End: 2018-05-01
Payer: COMMERCIAL

## 2018-05-01 VITALS
SYSTOLIC BLOOD PRESSURE: 118 MMHG | HEIGHT: 61 IN | HEART RATE: 87 BPM | WEIGHT: 117 LBS | OXYGEN SATURATION: 100 % | TEMPERATURE: 98.2 F | BODY MASS INDEX: 22.09 KG/M2 | RESPIRATION RATE: 16 BRPM | DIASTOLIC BLOOD PRESSURE: 76 MMHG

## 2018-05-01 DIAGNOSIS — K25.4 GASTROINTESTINAL HEMORRHAGE ASSOCIATED WITH GASTRIC ULCER: Primary | ICD-10-CM

## 2018-05-01 LAB
ERYTHROCYTE [DISTWIDTH] IN BLOOD BY AUTOMATED COUNT: 17 % (ref 10–15)
HCT VFR BLD AUTO: 42.4 % (ref 35–47)
HGB BLD-MCNC: 13.6 G/DL (ref 11.7–15.7)
MCH RBC QN AUTO: 29.4 PG (ref 26.5–33)
MCHC RBC AUTO-ENTMCNC: 32.1 G/DL (ref 31.5–36.5)
MCV RBC AUTO: 92 FL (ref 78–100)
PLATELET # BLD AUTO: 223 10E9/L (ref 150–450)
RBC # BLD AUTO: 4.62 10E12/L (ref 3.8–5.2)
WBC # BLD AUTO: 7.2 10E9/L (ref 4–11)

## 2018-05-01 PROCEDURE — 85027 COMPLETE CBC AUTOMATED: CPT | Performed by: FAMILY MEDICINE

## 2018-05-01 PROCEDURE — 36415 COLL VENOUS BLD VENIPUNCTURE: CPT | Performed by: FAMILY MEDICINE

## 2018-05-01 PROCEDURE — 99213 OFFICE O/P EST LOW 20 MIN: CPT | Performed by: FAMILY MEDICINE

## 2018-05-01 ASSESSMENT — PAIN SCALES - GENERAL: PAINLEVEL: NO PAIN (0)

## 2018-05-01 NOTE — NURSING NOTE
"Chief Complaint   Patient presents with     GI Problem     Recheck Medication     omeprazole     Panel Management     mychart, pcv, flu, lipid       Initial /76 (BP Location: Right arm, Patient Position: Chair, Cuff Size: Adult Small)  Pulse 87  Temp 98.2  F (36.8  C) (Temporal)  Resp 16  Ht 5' 1.42\" (1.56 m)  Wt 117 lb (53.1 kg)  SpO2 100%  BMI 21.81 kg/m2 Estimated body mass index is 21.81 kg/(m^2) as calculated from the following:    Height as of this encounter: 5' 1.42\" (1.56 m).    Weight as of this encounter: 117 lb (53.1 kg).  Medication Reconciliation: complete    "

## 2018-05-01 NOTE — TELEPHONE ENCOUNTER
Left message for patient to return call     ----- Message from Heidi Inman MD sent at 5/1/2018  1:38 PM CDT -----  Please inform patient that the hemoglobin levels are back to normal.  She can stop taking exclusive iron pills and just take multivitamin with iron

## 2018-05-01 NOTE — MR AVS SNAPSHOT
"              After Visit Summary   5/1/2018    Colleen Rowan    MRN: [id number]           Patient Information     Date Of Birth          1/6/1934        Visit Information        Provider Department      5/1/2018 9:20 AM Heidi Inman MD [facility]        Today's Diagnoses     Gastrointestinal hemorrhage associated with gastric ulcer    -  1    Need for prophylactic vaccination against Streptococcus pneumoniae (pneumococcus)           Follow-ups after your visit        Follow-up notes from your care team     Return if symptoms worsen or fail to improve.      Who to contact     If you have questions or need follow up information about today's clinic visit or your schedule please contact [facility] directly at [phone].  Normal or non-critical lab and imaging results will be communicated to you by MyChart, letter or phone within 4 business days after the clinic has received the results. If you do not hear from us within 7 days, please contact the clinic through MyChart or phone. If you have a critical or abnormal lab result, we will notify you by phone as soon as possible.  Submit refill requests through [employer] or call your pharmacy and they will forward the refill request to us. Please allow 3 business days for your refill to be completed.          Additional Information About Your Visit        MyChart Information     [employer] lets you send messages to your doctor, view your test results, renew your prescriptions, schedule appointments and more. To sign up, go to [email or web address] . Click on \"Log in\" on the left side of the screen, which will take you to the Welcome page. Then click on \"Sign up Now\" on the right side of the page.     You will be asked to enter the access code listed below, as well as some personal information. Please follow the directions to create your username and password.     Your access code is: [email or web address]  Expires: 7/9/2018  8:11 AM     Your " "access code will  in 90 days. If you need help or a new code, please call your [facility] clinic or [phone].        Care EveryWhere ID     This is your Care EveryWhere ID. This could be used by other organizations to access your [facility] medical records  [id number]        Your Vitals Were     Pulse Temperature Respirations Height Pulse Oximetry BMI (Body Mass Index)    87 98.2  F (36.8  C) (Temporal) 16 5' 1.42\" (1.56 m) 100% 21.81 kg/m2       Blood Pressure from Last 3 Encounters:   18 118/76   18 122/74   10/24/17 120/72    Weight from Last 3 Encounters:   18 117 lb (53.1 kg)   18 121 lb (54.9 kg)   10/24/17 124 lb (56.2 kg)              We Performed the Following     CBC with platelets          Today's Medication Changes          These changes are accurate as of 18 10:03 AM.  If you have any questions, ask your nurse or doctor.               Stop taking these medicines if you haven't already. Please contact your care team if you have questions.     pantoprazole 40 MG EC tablet   Commonly known as:  PROTONIX   Stopped by:  Heidi Inman MD                    Primary Care Provider Office Phone # Fax #    Heidi Inman [phone]       [address] [place] [address]  [facility] [address]        Equal Access to Services     [facility] AH: Hadii amaris shrestha Sorochelle, waaxda luqadaha, qaybta kaalmada gabrielle, simón moon . So [facility] [phone].    ATENCIÓN: Si habla español, tiene a cheung disposición servicios gratuitos de asistencia lingüística. Llame al [phone].    We comply with applicable federal civil rights laws and [place] laws. We do not discriminate on the basis of race, color, national origin, age, disability, sex, sexual orientation, or gender identity.            Thank you!     Thank you for choosing [facility]  for your care. Our goal is always to provide you with excellent care. Hearing back from " our patients is one way we can continue to improve our services. Please take a few minutes to complete the written survey that you may receive in the mail after your visit with us. Thank you!             Your Updated Medication List - Protect others around you: Learn how to safely use, store and throw away your medicines at www.disposemymeds.org.          This list is accurate as of 5/1/18 10:03 AM.  Always use your most recent med list.                   Brand Name Dispense Instructions for use Diagnosis    aspirin 81 MG EC tablet     1 tablet    Take 1 tablet by mouth daily.        calcium citrate 950 MG tablet    CALCITRATE     Take 1 tablet by mouth 2 times daily.        losartan 50 MG tablet    COZAAR    90 tablet    Take 1 tablet (50 mg) by mouth daily    HTN, goal below 140/90       metoprolol succinate 25 MG 24 hr tablet    TOPROL-XL    90 tablet    TAKE 1 TABLET EVERY DAY    Palpitations       OCUVITE PO      Take  by mouth.        omeprazole 20 MG CR capsule    priLOSEC    90 capsule    TAKE 1 CAPSULE EVERY DAY    Gastrointestinal hemorrhage associated with gastric ulcer       traZODone 50 MG tablet    DESYREL    30 tablet    Take 1 tablet (50 mg) by mouth nightly as needed for sleep    Insomnia, unspecified type       VITAMIN C PO      Take 1 tablet by mouth daily.        VITAMIN D PO      Take  by mouth.        VITAMIN E NATURAL PO      Take 400 Units by mouth daily

## 2018-05-01 NOTE — ASSESSMENT & PLAN NOTE
Recheck on her CBC shows normal hemoglobin levels currently.  I advised her to continue to take the omeprazole twice a day as she has been taking in the past.  When she is out of the current prescription I advised her to cut back on the dose to once a day.  Will send a new prescription when she is running low for a 3 month supply.  Can switch to an H2 blocker after a year since her last GI bleed.  Patient is agreeable to the above plan.

## 2018-06-04 ENCOUNTER — TRANSFERRED RECORDS (OUTPATIENT)
Dept: HEALTH INFORMATION MANAGEMENT | Facility: CLINIC | Age: 83
End: 2018-06-04

## 2018-08-06 DIAGNOSIS — R00.2 PALPITATIONS: ICD-10-CM

## 2018-08-07 RX ORDER — METOPROLOL SUCCINATE 25 MG/1
TABLET, EXTENDED RELEASE ORAL
Qty: 90 TABLET | Refills: 3 | Status: SHIPPED | OUTPATIENT
Start: 2018-08-07 | End: 2019-11-11

## 2018-08-07 NOTE — TELEPHONE ENCOUNTER
"Requested Prescriptions   Pending Prescriptions Disp Refills     metoprolol succinate (TOPROL-XL) 25 MG 24 hr tablet [Pharmacy Med Name: METOPROLOL SUCCINATE ER 25 MG Tablet Extended Release 24 Hour] 90 tablet 2     Sig: TAKE 1 TABLET EVERY DAY    Beta-Blockers Protocol Passed    8/6/2018  9:25 PM       Passed - Blood pressure under 140/90 in past 12 months    BP Readings from Last 3 Encounters:   05/01/18 118/76   01/19/18 122/74   10/24/17 120/72                Passed - Patient is age 6 or older       Passed - Recent (12 mo) or future (30 days) visit within the authorizing provider's specialty    Patient had office visit in the last 12 months or has a visit in the next 30 days with authorizing provider or within the authorizing provider's specialty.  See \"Patient Info\" tab in inbasket, or \"Choose Columns\" in Meds & Orders section of the refill encounter.            Last OV: 05/01/18    Prescription approved per [facility] Refill Protocol.    Alejandro Barnes, RN, BSN    "

## 2018-11-05 DIAGNOSIS — I10 HTN, GOAL BELOW 140/90: ICD-10-CM

## 2018-11-06 RX ORDER — LOSARTAN POTASSIUM 50 MG/1
TABLET ORAL
Qty: 90 TABLET | Refills: 3 | OUTPATIENT
Start: 2018-11-06

## 2018-11-06 NOTE — TELEPHONE ENCOUNTER
"Requested Prescriptions   Pending Prescriptions Disp Refills     losartan (COZAAR) 50 MG tablet [Pharmacy Med Name: LOSARTAN POTASSIUM 50 MG Tablet] 90 tablet 3     Sig: TAKE 1 TABLET EVERY DAY    Angiotensin-II Receptors Passed    11/5/2018  8:15 PM       Passed - Blood pressure under 140/90 in past 12 months    BP Readings from Last 3 Encounters:   05/01/18 118/76   01/19/18 122/74   10/24/17 120/72          Passed - Recent (12 mo) or future (30 days) visit within the authorizing provider's specialty    Patient had office visit in the last 12 months or has a visit in the next 30 days with authorizing provider or within the authorizing provider's specialty.  See \"Patient Info\" tab in inbasket, or \"Choose Columns\" in Meds & Orders section of the refill encounter.         Passed - Patient is age 18 or older       Passed - No active pregnancy on record       Passed - Normal serum creatinine on file in past 12 months    Recent Labs   Lab Test  01/19/18   0920   03/17/15   0915   CR  0.78   < >   --    CREAT   --    --   1.0    < > = values in this interval not displayed.          Passed - Normal serum potassium on file in past 12 months    Recent Labs   Lab Test  01/19/18   0920   POTASSIUM  4.9          Passed - No positive pregnancy test in past 12 months        losartan (COZAAR) 50 MG tablet  Rx was sent 01/19/2018 for 90 tabs and 3 refills.   Pharmacy notified via E-prescribe refusal.  Kanchan Hamilton, RN, BSN         "

## 2019-02-06 DIAGNOSIS — I10 HTN, GOAL BELOW 140/90: ICD-10-CM

## 2019-02-07 NOTE — TELEPHONE ENCOUNTER
Cozaar  Routing refill request to provider for review/approval because:  Labs not current:  [facility]    Sandra Cano, RN, BSN           Visit Information Date & Time Provider Department Dept. Phone Encounter #  
 9/25/2017 10:00 AM RONDA Shields 19 [phone] [id number] Your Appointments 11/30/2017  9:00 AM  
WELL CHILD VISIT with RONDA Tejeda 19 ([address]) Appt Note: 12mo wcc  
 [address] [facility] [phone]  
  
   
 [address] [facility] [phone] Upcoming Health Maintenance Date Due PEDIATRIC DENTIST REFERRAL 5/17/2017 INFLUENZA PEDS 6M-8Y (1 of 2) 8/1/2017 Hib Peds Age 0-5 (4 of 4 - Standard Series) 11/17/2017 PCV Peds Age 0-5 (4 of 4 - Standard Series) 11/17/2017 DTaP/Tdap/Td series (4 - DTaP) 2/17/2018 IPV Peds Age 0-18 (4 of 4 - All-IPV Series) 11/17/2020 MCV through Age 25 (1 of 2) 11/17/2027 Allergies as of 9/25/2017  Review Complete On: 9/25/2017 By: Belen Smith NP No Known Allergies Current Immunizations  Reviewed on 9/18/2017 Name Date [id number] 5/31/2017  3:01 PM, 3/31/2017  9:29 AM, 1/31/2017  9:04 AM  
 Hep B, Adol/Ped 5/31/2017  3:03 PM, 1/3/2017, 2016 Pneumococcal Conjugate (PCV-13) 5/31/2017  3:00 PM, 3/31/2017  9:28 AM, 1/31/2017  9:03 AM  
 Rotavirus, Live, Monovalent Vaccine 3/31/2017  9:27 AM, 1/31/2017  9:04 AM  
  
 Not reviewed this visit You Were Diagnosed With   
  
 Codes Comments Cough    -  Primary ICD-10-CM: V41 ICD-9-CM: 950. 2 Vitals Pulse Temp Resp Height(growth percentile) Weight(growth percentile) BMI  
 120 96 °F (35.6 °C) (Axillary) 20 (!) 2' 3.95\" (0.71 m) (13 %, Z= -1.14)* 24 lb 0.5 oz (10.9 kg) (94 %, Z= 1.54)* 21.62 kg/m2 Smoking Status Never Smoker *Growth percentiles are based on WHO (Boys, 0-2 years) data. BSA Data Body Surface Area  
 0.46 m 2 Preferred Pharmacy Pharmacy Name Phone [address] [facility] [place] Nikolay Andrews [facility] [phone] Your Updated Medication List  
  
   
This list is accurate as of: 9/25/17 10:54 AM.  Always use your most recent med list.  
  
  
  
  
 amoxicillin 400 mg/5 mL suspension Commonly known as:  AMOXIL Take 3.5 mL by mouth every eight (8) hours for 10 days. Indications: ACUTE OTITIS MEDIA  
  
 azithromycin 200 mg/5 mL suspension Commonly known as:  [place] Dec Day 1- take 2.7 ml po x 1. Then days 2-5 take 1.3 ml po q.day  Indications: PERTUSSIS Introducing [facility]! Dear Parent or Guardian, Thank you for requesting a [employer] account for your child. With [employer], you can view your childs hospital or ER discharge instructions, current allergies, immunizations and much more. In order to access your childs information, we require a signed consent on file. Please see the [facility] department or call [phone] for instructions on completing a [employer] Proxy request.   
Additional Information If you have questions, please visit the Frequently Asked Questions section of the [employer] website at [email or web address] Remember, [employer] is NOT to be used for urgent needs. For medical emergencies, dial 911. Now available from your iPhone and Android! Please provide this summary of care documentation to your next provider. Your primary care clinician is listed as Naveed Fernandez. If you have any questions after today's visit, please call [phone].

## 2019-02-08 RX ORDER — LOSARTAN POTASSIUM 50 MG/1
TABLET ORAL
Qty: 90 TABLET | Refills: 3 | OUTPATIENT
Start: 2019-02-08

## 2019-02-08 NOTE — TELEPHONE ENCOUNTER
Patient notified she will call back next week to schedule as she wants to see what the weather will be like first.

## 2019-02-11 NOTE — PROGRESS NOTES
SUBJECTIVE:   Colleen Rowan is a 85 year old female who presents to clinic today for the following health issues:      History of Present Illness     Hypertension:     Outpatient blood pressures:  Are not being checked    Dietary sodium intake::  No added salt diet  Frequency of exercise:  None  Taking medications regularly:  Yes  Medication side effects:  None  Additional concerns today:  Yes    Joint Pain    Onset: November 2018    Description:   Location: left knee  Character: Dull ache    Intensity: severe, 9/10    Progression of Symptoms:  same    Accompanying Signs & Symptoms:  Other symptoms: swelling    History:   Previous similar pain: YES- Last Winter      Precipitating factors:   Trauma or overuse: no     Alleviating factors:  Improved by: nothing  Therapies Tried and outcome: Heat      Problem list and histories reviewed & adjusted, as indicated.  Additional history: as documented        Patient Active Problem List   Diagnosis     Disorder of bone and cartilage     Ganglion     Senile osteoporosis     Advanced directives, counseling/discussion     CAD (coronary artery disease): abn EKG, with history of chest pain ca 1995     HTN, goal below 140/90     Respiratory abnormality     Back pain     Insomnia     Aneurysm of thoracic aorta (H)     Cystocele     Gastrointestinal hemorrhage associated with gastric ulcer     Acute pain of left knee     Past Surgical History:   Procedure Laterality Date     C LIGATE FALLOPIAN TUBE  age 22    tubal ligation     FUSION LUMBAR POSTERIOR ARTIFICIAL DISK WITH SYSTEM  08/14/2012    L-1,[facility] EXCISION TUMOR SOFT TISSUE NECK/THORAX IM <5CM  12/30/2003    Excision of intramuscular mass, right posterolateral chest.       Social History     Tobacco Use     Smoking status: Never Smoker     Smokeless tobacco: Never Used   Substance Use Topics     Alcohol use: No     Family History   Problem Relation Age of Onset     Allergies Sister         latex     Eye  Disorder Sister         cataracts     Arthritis Mother      Hypertension Mother      Cerebrovascular Disease Mother 94     Eye Disorder Mother         cataracts     Osteoporosis Mother         kyphosis     Cancer Father         prostate     Cerebrovascular Disease Father 80     Prostate Cancer Father      Depression Father      Neurologic Disorder Father         Parkinson's     Cancer Maternal Grandmother         uterine ca, and pelvic bone cancer     Cerebrovascular Disease Maternal Grandmother      Cerebrovascular Disease Maternal Grandfather      Asthma Daughter      Thyroid Disease Daughter      Cancer Son 48        non hodgkins lymphoma     C.A.D. No family hx of      Diabetes No family hx of      Breast Cancer No family hx of      Cancer - colorectal No family hx of      Alzheimer Disease No family hx of      Cardiovascular No family hx of      Heart Disease No family hx of      Circulatory No family hx of      Gastrointestinal Disease No family hx of      Genitourinary Problems No family hx of      Lipids No family hx of      Musculoskeletal Disorder No family hx of      Respiratory No family hx of          Current Outpatient Medications   Medication Sig Dispense Refill     Ascorbic Acid (VITAMIN C PO) Take 1 tablet by mouth daily.       aspirin 81 MG EC tablet Take 1 tablet by mouth daily. 1 tablet 0     calcium citrate (CALCITRATE) 950 MG tablet Take 1 tablet by mouth 2 times daily.       Cholecalciferol (VITAMIN D PO) Take  by mouth.       diclofenac (VOLTAREN) 1 % topical gel Place onto the skin 4 times daily 100 g 3     losartan (COZAAR) 50 MG tablet Take 1 tablet (50 mg) by mouth daily 10 tablet 0     metoprolol succinate (TOPROL-XL) 25 MG 24 hr tablet TAKE 1 TABLET EVERY DAY 90 tablet 3     Multiple Vitamins-Minerals (OCUVITE PO) Take  by mouth.       omeprazole (PRILOSEC) 20 MG CR capsule TAKE 1 CAPSULE EVERY DAY 90 capsule 3     VITAMIN E NATURAL PO Take 400 Units by mouth daily       Allergies  "  Allergen Reactions     Penicillins      hives     No Clinical Screening - See Comments Rash     BP Readings from Last 3 Encounters:   02/13/19 122/64   05/01/18 118/76   01/19/18 122/74    Wt Readings from Last 3 Encounters:   02/13/19 54.4 kg (120 lb)   05/01/18 53.1 kg (117 lb)   01/19/18 54.9 kg (121 lb)                  Labs reviewed in EPIC    ROS:  Constitutional, HEENT, cardiovascular, pulmonary, gi and gu systems are negative, except as otherwise noted.    OBJECTIVE:     /64 (BP Location: Right arm, Patient Position: Chair, Cuff Size: Adult Regular)   Pulse 66   Temp 97.7  F (36.5  C) (Temporal)   Resp 16   Ht 1.556 m (5' 1.26\")   Wt 54.4 kg (120 lb)   SpO2 98%   BMI 22.48 kg/m    Body mass index is 22.48 kg/m .   Physical Exam   Constitutional: She appears well-developed and well-nourished.   HENT:   Head: Normocephalic and atraumatic.   Cardiovascular: Normal rate, regular rhythm, normal heart sounds and intact distal pulses. Exam reveals no gallop and no friction rub.   No murmur heard.  Pulmonary/Chest: Effort normal and breath sounds normal.   Abdominal: Soft. Bowel sounds are normal.   Musculoskeletal: She exhibits no edema, tenderness or deformity.   Limited Extension of the left knee joint       Diagnostic Test Results:  none     ASSESSMENT/PLAN:     Problem List Items Addressed This Visit     HTN, goal below 140/90     Blood pressures are well controlled.  Continue losartan at the current dose.  She is due to get her chemistries redrawn.  Recheck labs today.  Refills sent for the next year.  Follow-up in 1 year.         Relevant Medications    losartan (COZAAR) 50 MG tablet    Other Relevant Orders    COMPREHENSIVE METABOLIC PANEL (Completed)    Lipid panel reflex to direct LDL Fasting (Completed)    CBC with platelets (Completed)    Gastrointestinal hemorrhage associated with gastric ulcer    Acute pain of left knee     Likely arthritis. Advised against use nsaids due to history of " bleeding ulcer in the past.  Discussed trial topical diclofenac   Offered x-rays . Pt declined         Relevant Medications    diclofenac (VOLTAREN) 1 % topical gel      Other Visit Diagnoses     Need for prophylactic vaccination and inoculation against influenza    -  Primary           Heidi Inman MD  [facility]

## 2019-02-13 ENCOUNTER — OFFICE VISIT (OUTPATIENT)
Dept: FAMILY MEDICINE | Facility: OTHER | Age: 84
End: 2019-02-13
Payer: COMMERCIAL

## 2019-02-13 VITALS
SYSTOLIC BLOOD PRESSURE: 122 MMHG | HEART RATE: 66 BPM | HEIGHT: 61 IN | TEMPERATURE: 97.7 F | OXYGEN SATURATION: 98 % | BODY MASS INDEX: 22.66 KG/M2 | RESPIRATION RATE: 16 BRPM | DIASTOLIC BLOOD PRESSURE: 64 MMHG | WEIGHT: 120 LBS

## 2019-02-13 DIAGNOSIS — I10 HTN, GOAL BELOW 140/90: Primary | ICD-10-CM

## 2019-02-13 DIAGNOSIS — Z23 NEED FOR PROPHYLACTIC VACCINATION AND INOCULATION AGAINST INFLUENZA: ICD-10-CM

## 2019-02-13 DIAGNOSIS — M25.562 ACUTE PAIN OF LEFT KNEE: ICD-10-CM

## 2019-02-13 DIAGNOSIS — K25.4 GASTROINTESTINAL HEMORRHAGE ASSOCIATED WITH GASTRIC ULCER: ICD-10-CM

## 2019-02-13 LAB
ALBUMIN SERPL-MCNC: 3.9 G/DL (ref 3.4–5)
ALP SERPL-CCNC: 62 U/L (ref 40–150)
ALT SERPL W P-5'-P-CCNC: 17 U/L (ref 0–50)
ANION GAP SERPL CALCULATED.3IONS-SCNC: 9 MMOL/L (ref 3–14)
AST SERPL W P-5'-P-CCNC: 12 U/L (ref 0–45)
BILIRUB SERPL-MCNC: 0.7 MG/DL (ref 0.2–1.3)
BUN SERPL-MCNC: 27 MG/DL (ref 7–30)
CALCIUM SERPL-MCNC: 9.3 MG/DL (ref 8.5–10.1)
CHLORIDE SERPL-SCNC: 104 MMOL/L (ref 94–109)
CHOLEST SERPL-MCNC: 174 MG/DL
CO2 SERPL-SCNC: 28 MMOL/L (ref 20–32)
CREAT SERPL-MCNC: 0.78 MG/DL (ref 0.52–1.04)
ERYTHROCYTE [DISTWIDTH] IN BLOOD BY AUTOMATED COUNT: 14.3 % (ref 10–15)
GFR SERPL CREATININE-BSD FRML MDRD: 69 ML/MIN/{1.73_M2}
GLUCOSE SERPL-MCNC: 109 MG/DL (ref 70–99)
HCT VFR BLD AUTO: 43.2 % (ref 35–47)
HDLC SERPL-MCNC: 75 MG/DL
HGB BLD-MCNC: 13.8 G/DL (ref 11.7–15.7)
LDLC SERPL CALC-MCNC: 88 MG/DL
MCH RBC QN AUTO: 31.7 PG (ref 26.5–33)
MCHC RBC AUTO-ENTMCNC: 31.9 G/DL (ref 31.5–36.5)
MCV RBC AUTO: 99 FL (ref 78–100)
NONHDLC SERPL-MCNC: 99 MG/DL
PLATELET # BLD AUTO: 183 10E9/L (ref 150–450)
POTASSIUM SERPL-SCNC: 5 MMOL/L (ref 3.4–5.3)
PROT SERPL-MCNC: 7.1 G/DL (ref 6.8–8.8)
RBC # BLD AUTO: 4.35 10E12/L (ref 3.8–5.2)
SODIUM SERPL-SCNC: 141 MMOL/L (ref 133–144)
TRIGL SERPL-MCNC: 55 MG/DL
WBC # BLD AUTO: 7.4 10E9/L (ref 4–11)

## 2019-02-13 PROCEDURE — 80061 LIPID PANEL: CPT | Performed by: FAMILY MEDICINE

## 2019-02-13 PROCEDURE — 80053 COMPREHEN METABOLIC PANEL: CPT | Performed by: FAMILY MEDICINE

## 2019-02-13 PROCEDURE — 85027 COMPLETE CBC AUTOMATED: CPT | Performed by: FAMILY MEDICINE

## 2019-02-13 PROCEDURE — 36415 COLL VENOUS BLD VENIPUNCTURE: CPT | Performed by: FAMILY MEDICINE

## 2019-02-13 PROCEDURE — 99214 OFFICE O/P EST MOD 30 MIN: CPT | Performed by: FAMILY MEDICINE

## 2019-02-13 RX ORDER — LOSARTAN POTASSIUM 50 MG/1
50 TABLET ORAL DAILY
Qty: 10 TABLET | Refills: 0 | Status: SHIPPED | OUTPATIENT
Start: 2019-02-13 | End: 2019-11-25

## 2019-02-13 RX ORDER — LOSARTAN POTASSIUM 50 MG/1
50 TABLET ORAL DAILY
Qty: 90 TABLET | Refills: 3 | Status: SHIPPED | OUTPATIENT
Start: 2019-02-13 | End: 2019-02-13

## 2019-02-13 ASSESSMENT — MIFFLIN-ST. JEOR: SCORE: 930.82

## 2019-02-13 ASSESSMENT — PAIN SCALES - GENERAL: PAINLEVEL: EXTREME PAIN (9)

## 2019-02-13 NOTE — LETTER
February 13, 2019      Colleen Rowan  [address] [facility] [address]        Dear Colleen,     This letter is to inform you that your recent lab testing was normal.    Results for orders placed or performed in visit on 02/13/19   CBC with platelets   Result Value Ref Range    WBC 7.4 4.0 - 11.0 10e9/L    RBC Count 4.35 3.8 - 5.2 10e12/L    Hemoglobin 13.8 11.7 - 15.7 g/dL    Hematocrit 43.2 35.0 - 47.0 %    MCV 99 78 - 100 fl    MCH 31.7 26.5 - 33.0 pg    MCHC 31.9 31.5 - 36.5 g/dL    RDW 14.3 10.0 - 15.0 %    Platelet Count 183 150 - 450 10e9/L       Please let us know if you have any further questions or concerns.    Thanks,  [place] Care Team

## 2019-02-13 NOTE — ASSESSMENT & PLAN NOTE
Likely arthritis. Advised against use nsaids due to history of bleeding ulcer in the past.  Discussed trial topical diclofenac   Offered x-rays . Pt declined

## 2019-04-29 NOTE — PROGRESS NOTES
SUBJECTIVE:   Colleen Rowan is a 85 year old female who presents to clinic today for the following health issues:      HPI     Hypertension Follow-up      Outpatient blood pressures are being checked at home.    Low Salt Diet: no added salt    Patient states on Sunday she was watching TV and a commercial came on about that Losartan can cause stomach cancer.  Patient stated she got to anxious because she has been taking the medication for 15 years.  Patient stated she was sick to her stomach and nauseous.  Patient stated her BP was elevated after that, but her daughter came down yesterday and helped calm her down- so is feeling better today.        Additional history: as documented    Reviewed and updated as needed this visit by clinical staff         Reviewed and updated as needed this visit by Provider             Patient Active Problem List   Diagnosis     Disorder of bone and cartilage     Ganglion     Senile osteoporosis     Advanced directives, counseling/discussion     CAD (coronary artery disease): abn EKG, with history of chest pain ca 1995     HTN, goal below 140/90     Respiratory abnormality     Back pain     Insomnia     Aneurysm of thoracic aorta (H)     Cystocele     Gastrointestinal hemorrhage associated with gastric ulcer     Acute pain of left knee     Acquired spondylolisthesis     Past Surgical History:   Procedure Laterality Date     C LIGATE FALLOPIAN TUBE  age 22    tubal ligation     FUSION LUMBAR POSTERIOR ARTIFICIAL DISK WITH SYSTEM  08/14/2012    L-1,[facility] EXCISION TUMOR SOFT TISSUE NECK/THORAX IM <5CM  12/30/2003    Excision of intramuscular mass, right posterolateral chest.       Social History     Tobacco Use     Smoking status: Never Smoker     Smokeless tobacco: Never Used   Substance Use Topics     Alcohol use: No     Family History   Problem Relation Age of Onset     Allergies Sister         latex     Eye Disorder Sister         cataracts     Arthritis Mother       Hypertension Mother      Cerebrovascular Disease Mother 94     Eye Disorder Mother         cataracts     Osteoporosis Mother         kyphosis     Cancer Father         prostate     Cerebrovascular Disease Father 80     Prostate Cancer Father      Depression Father      Neurologic Disorder Father         Parkinson's     Cancer Maternal Grandmother         uterine ca, and pelvic bone cancer     Cerebrovascular Disease Maternal Grandmother      Cerebrovascular Disease Maternal Grandfather      Asthma Daughter      Thyroid Disease Daughter      Cancer Son 48        non hodgkins lymphoma     C.A.D. No family hx of      Diabetes No family hx of      Breast Cancer No family hx of      Cancer - colorectal No family hx of      Alzheimer Disease No family hx of      Cardiovascular No family hx of      Heart Disease No family hx of      Circulatory No family hx of      Gastrointestinal Disease No family hx of      Genitourinary Problems No family hx of      Lipids No family hx of      Musculoskeletal Disorder No family hx of      Respiratory No family hx of          Current Outpatient Medications   Medication Sig Dispense Refill     Ascorbic Acid (VITAMIN C PO) Take 1 tablet by mouth daily.       aspirin 81 MG EC tablet Take 1 tablet by mouth daily. 1 tablet 0     calcium citrate (CALCITRATE) 950 MG tablet Take 1 tablet by mouth 2 times daily.       Cholecalciferol (VITAMIN D PO) Take  by mouth.       diclofenac (VOLTAREN) 1 % topical gel Place onto the skin 4 times daily 100 g 3     losartan (COZAAR) 50 MG tablet Take 1 tablet (50 mg) by mouth daily 10 tablet 0     metoprolol succinate (TOPROL-XL) 25 MG 24 hr tablet TAKE 1 TABLET EVERY DAY 90 tablet 3     Multiple Vitamins-Minerals (OCUVITE PO) Take  by mouth.       omeprazole (PRILOSEC) 20 MG CR capsule TAKE 1 CAPSULE EVERY DAY 90 capsule 3     VITAMIN E NATURAL PO Take 400 Units by mouth daily       Allergies   Allergen Reactions     Penicillins      hives     No  "Clinical Screening - See Comments Rash     BP Readings from Last 3 Encounters:   04/30/19 124/70   02/13/19 122/64   05/01/18 118/76    Wt Readings from Last 3 Encounters:   04/30/19 54 kg (119 lb)   02/13/19 54.4 kg (120 lb)   05/01/18 53.1 kg (117 lb)                  Labs reviewed in EPIC    ROS:  Constitutional, HEENT, cardiovascular, pulmonary, GI, , musculoskeletal, neuro, skin, endocrine and psych systems are negative, except as otherwise noted.    OBJECTIVE:     /70 (BP Location: Right arm, Patient Position: Chair, Cuff Size: Adult Regular)   Pulse 83   Temp 97.2  F (36.2  C) (Temporal)   Resp 16   Ht 1.549 m (5' 1\")   Wt 54 kg (119 lb)   SpO2 98%   BMI 22.48 kg/m    Body mass index is 22.48 kg/m .   Physical Exam   Constitutional: She appears well-developed and well-nourished.   HENT:   Head: Normocephalic and atraumatic.   Cardiovascular: Normal rate, regular rhythm and normal heart sounds.   Pulmonary/Chest: Effort normal and breath sounds normal.   Psychiatric: She has a normal mood and affect. Her behavior is normal. Judgment and thought content normal.         Diagnostic Test Results:  none     ASSESSMENT/PLAN:     Problem List Items Addressed This Visit     HTN, goal below 140/90     Patient has been on losartan for close to 15 yrs and was worried about cancer after recent adds on TV showing the recent recall. Reassured and discussed data. Also encouraged to call phamacy to find out if the brand that she was prescribed was recalled at all. BP today are well controlled  She recently lost one of her dogs and has been anxious about her health . Reassured her.            Other Visit Diagnoses     Need for prophylactic vaccination and inoculation against influenza        Need for prophylactic vaccination against Streptococcus pneumoniae (pneumococcus)                 Heidi Inman MD  [facility]"

## 2019-04-30 ENCOUNTER — OFFICE VISIT (OUTPATIENT)
Dept: FAMILY MEDICINE | Facility: OTHER | Age: 84
End: 2019-04-30
Payer: COMMERCIAL

## 2019-04-30 VITALS
HEART RATE: 83 BPM | TEMPERATURE: 97.2 F | RESPIRATION RATE: 16 BRPM | SYSTOLIC BLOOD PRESSURE: 124 MMHG | HEIGHT: 61 IN | OXYGEN SATURATION: 98 % | WEIGHT: 119 LBS | DIASTOLIC BLOOD PRESSURE: 70 MMHG | BODY MASS INDEX: 22.47 KG/M2

## 2019-04-30 DIAGNOSIS — I10 HTN, GOAL BELOW 140/90: ICD-10-CM

## 2019-04-30 PROCEDURE — 99213 OFFICE O/P EST LOW 20 MIN: CPT | Performed by: FAMILY MEDICINE

## 2019-04-30 ASSESSMENT — MIFFLIN-ST. JEOR: SCORE: 922.16

## 2019-04-30 ASSESSMENT — PAIN SCALES - GENERAL: PAINLEVEL: NO PAIN (0)

## 2019-04-30 NOTE — ASSESSMENT & PLAN NOTE
Patient has been on losartan for close to 15 yrs and was worried about cancer after recent adds on TV showing the recent recall. Reassured and discussed data. Also encouraged to call phamacy to find out if the brand that she was prescribed was recalled at all. BP today are well controlled  She recently lost one of her dogs and has been anxious about her health . Reassured her.

## 2019-07-01 ENCOUNTER — OFFICE VISIT (OUTPATIENT)
Dept: FAMILY MEDICINE | Facility: OTHER | Age: 84
End: 2019-07-01
Payer: COMMERCIAL

## 2019-07-01 VITALS
OXYGEN SATURATION: 99 % | HEART RATE: 66 BPM | RESPIRATION RATE: 14 BRPM | BODY MASS INDEX: 21.35 KG/M2 | TEMPERATURE: 98.2 F | HEIGHT: 62 IN | DIASTOLIC BLOOD PRESSURE: 66 MMHG | WEIGHT: 116 LBS | SYSTOLIC BLOOD PRESSURE: 124 MMHG

## 2019-07-01 DIAGNOSIS — L89.153 PRESSURE INJURY OF SACRAL REGION, STAGE 3 (H): Primary | ICD-10-CM

## 2019-07-01 DIAGNOSIS — I71.20 THORACIC AORTIC ANEURYSM WITHOUT RUPTURE (H): ICD-10-CM

## 2019-07-01 PROCEDURE — 99213 OFFICE O/P EST LOW 20 MIN: CPT | Performed by: FAMILY MEDICINE

## 2019-07-01 ASSESSMENT — MIFFLIN-ST. JEOR: SCORE: 916.48

## 2019-07-01 NOTE — PATIENT INSTRUCTIONS
Patient Education   Help Us Protect Your Skin  What is a pressure injury?  A pressure injury is a serious problem in the hospital and at home. They are also called bedsores or pressure ulcers. Pressure injuries can slow recovery, cause pain and lead to other problems. They may begin as red areas on the skin, but they can cause serious damage to skin and muscles if you do not correct them.   Pressure sores often appear on:     Buttocks, hips, heels, elbows and shoulders.    Under medical devices, tubing, masks, casts or other medical equipment.  What causes a pressure injury?  Pressure injuries occur on body parts that you sit or lie on for too long. They can also occur under a device that presses on the skin, even your glasses Pressure squeezes tiny blood vessels that supply the skin and other tissues with nutrients and oxygen. When skin goes without nutrients and oxygen for too long, an injury forms.  Actions like sliding down in a bed stretches blood vessels and may lead to a pressure injury. Even slight rubbing on the skin may damage the skin or make a minor pressure injury worse.  Wounds and injuries are more likely to develop or worsen if you:    Can't change positions.    Tend to slide down in your bed or chair.    Have problems feeling pressure or pain.    Have problems thinking clearly.    Lose control of your bowel or bladder (incontinence).    Don't get enough nutrients or fluids.  Your health care team can help  It's important to work with your health care team to reduce your risk of getting a pressure injury. Talk to your doctor or other care provider about your goals to prevent or limit pressure injuries.  Be active in your health care:    Ask questions and help plan your care    Explain your needs, wants and concerns    Learn what is best for you    Know what is being done and why  How can I avoid common injuries?  Limit pressure    Change your position in bed at least every two hours. If you are  unable to move yourself in bed, ask someone to help you.    If you are in a chair, shift your position at least every 15 minutes. If you are unable to shift your own weight, your caregiver will help you change your position at least every hour.  If you are in a bed for long periods of time    Talk to your care provider about getting a special mattress.    Try to keep the head of your bed as low as possible (unless other medical conditions do not permit it). This prevents sliding down in bed which can cause skin injury. If you need to raise the head of the bed, raise it to the lowest point possible for as short a time as possible. Try raising your feet before your head to prevent sliding.    Avoid lying directly on your hip bone when lying on your side.    Keep your heels off the bed by placing pillows under your legs from mid-calf to ankle. Never place pillows under the knee.    To keep your knees or ankles from touching each other, place pillows or foam wedges between your legs.  If you are in a chair or wheelchair:  Talk to your care provider about getting a chair cushion to reduce pressure while sitting.  Reduce rubbing    When moving in bed, don't pull or drag yourself across the sheets. Don't push or pull with your heels.    Avoid repetitive movements. For example, don't scratch your foot by rubbing it on the sheets.    Avoid doughnut-shaped cushions. These can hurt the tissues under your skin.  Reduce wetness    Tell your care provider if you have a problem with leaking urine or stool.    If leaking urine or stool is a problem, use absorbent pads while in bed and briefs that pull wetness away from your body while out of bed.    Apply a special cream or ointment to protect your skin from urine and stool (bowel movements). You may ask your care team for suggestions.  Skin care    Expect someone to check your skin at least twice every day.    If you notice reddened, purple, painful or sore areas, tell your care  team.    Prevent dry skin by using creams or oils.    Do not rub or massage skin over reddened, purple or sore parts of your body.  Be active in your healthcare. Reduce your risk for pressure injury.  For informational purposes only. Not to replace the advice of your health care provider.   Copyright   2006 [facility]. All rights reserved. [employer] 976926 - REV 07/18.

## 2019-07-01 NOTE — PROGRESS NOTES
Subjective     Colleen Rowan is a 85 year old female who presents to clinic today for the following health issues:    HPI   Concern - sore on tail bone - possible pilonidal cyst/bed sore?   Onset: about a month    Description:   Quarter sized open sore on tail bone that is painful and oozing     Intensity: moderate    Progression of Symptoms:  worsening    Accompanying Signs & Symptoms:  Pain, oozing    Previous history of similar problem:   none    Precipitating factors:   Worsened by: pressure    Alleviating factors:  Improved by: none    Therapies Tried and outcome: keeping a Band-Aid on it, Bacitracin   -------------------------------------    Patient Active Problem List   Diagnosis     Disorder of bone and cartilage     Ganglion     Senile osteoporosis     CAD (coronary artery disease): abn EKG, with history of chest pain ca 1995     HTN, goal below 140/90     Respiratory abnormality     Back pain     Insomnia     Aneurysm of thoracic aorta (H)     Cystocele     Gastrointestinal hemorrhage associated with gastric ulcer     Acute pain of left knee     Acquired spondylolisthesis     Pressure injury of sacral region, stage 3 (H)     Past Surgical History:   Procedure Laterality Date     C LIGATE FALLOPIAN TUBE  age 22    tubal ligation     FUSION LUMBAR POSTERIOR ARTIFICIAL DISK WITH SYSTEM  08/14/2012    L-1,[facility] EXCISION TUMOR SOFT TISSUE NECK/THORAX IM <5CM  12/30/2003    Excision of intramuscular mass, right posterolateral chest.       Social History     Tobacco Use     Smoking status: Never Smoker     Smokeless tobacco: Never Used   Substance Use Topics     Alcohol use: No     Family History   Problem Relation Age of Onset     Allergies Sister         latex     Eye Disorder Sister         cataracts     Arthritis Mother      Hypertension Mother      Cerebrovascular Disease Mother 94     Eye Disorder Mother         cataracts     Osteoporosis Mother         kyphosis     Cancer Father          prostate     Cerebrovascular Disease Father 80     Prostate Cancer Father      Depression Father      Neurologic Disorder Father         Parkinson's     Cancer Maternal Grandmother         uterine ca, and pelvic bone cancer     Cerebrovascular Disease Maternal Grandmother      Cerebrovascular Disease Maternal Grandfather      Asthma Daughter      Thyroid Disease Daughter      Cancer Son 48        non hodgkins lymphoma     C.A.D. No family hx of      Diabetes No family hx of      Breast Cancer No family hx of      Cancer - colorectal No family hx of      Alzheimer Disease No family hx of      Cardiovascular No family hx of      Heart Disease No family hx of      Circulatory No family hx of      Gastrointestinal Disease No family hx of      Genitourinary Problems No family hx of      Lipids No family hx of      Musculoskeletal Disorder No family hx of      Respiratory No family hx of          Current Outpatient Medications   Medication Sig Dispense Refill     Ascorbic Acid (VITAMIN C PO) Take 1 tablet by mouth daily.       aspirin 81 MG EC tablet Take 1 tablet by mouth daily. 1 tablet 0     calcium citrate (CALCITRATE) 950 MG tablet Take 1 tablet by mouth 2 times daily.       Cholecalciferol (VITAMIN D PO) Take  by mouth.       diclofenac (VOLTAREN) 1 % topical gel Place onto the skin 4 times daily 100 g 3     losartan (COZAAR) 50 MG tablet Take 1 tablet (50 mg) by mouth daily 10 tablet 0     metoprolol succinate (TOPROL-XL) 25 MG 24 hr tablet TAKE 1 TABLET EVERY DAY 90 tablet 3     Multiple Vitamins-Minerals (OCUVITE PO) Take  by mouth.       omeprazole (PRILOSEC) 20 MG CR capsule TAKE 1 CAPSULE EVERY DAY 90 capsule 3     VITAMIN E NATURAL PO Take 400 Units by mouth daily       Hydroactive Dressings (DUODERM HYDROACTIVE) PSTE Externally apply 1 Application topically daily (Duoderm hydroactive paste) 30 g 3     Allergies   Allergen Reactions     Penicillins      hives     No Clinical Screening - See Comments Rash  "    BP Readings from Last 3 Encounters:   07/05/19 130/82   07/01/19 124/66   04/30/19 124/70    Wt Readings from Last 3 Encounters:   07/05/19 53.3 kg (117 lb 8 oz)   07/01/19 52.6 kg (116 lb)   04/30/19 54 kg (119 lb)                    Reviewed and updated as needed this visit by Provider  Problems         Review of Systems   ROS COMP: Constitutional, HEENT, cardiovascular, pulmonary, gi and gu systems are negative, except as otherwise noted.      Objective    /66 (BP Location: Right arm, Patient Position: Chair, Cuff Size: Adult Regular)   Pulse 66   Temp 98.2  F (36.8  C) (Temporal)   Resp 14   Ht 1.562 m (5' 1.5\")   Wt 52.6 kg (116 lb)   SpO2 99%   BMI 21.56 kg/m    Body mass index is 21.56 kg/m .  Physical Exam   Constitutional: She appears well-developed and well-nourished.   HENT:   Head: Normocephalic and atraumatic.   Skin:   Stage 3 decubitus ulcer on the sacrum with minimal clear drainage   Psychiatric: She has a normal mood and affect. Her behavior is normal. Judgment and thought content normal.          Diagnostic Test Results:  Labs reviewed in Epic        Assessment & Plan   Problem List Items Addressed This Visit     Aneurysm of thoracic aorta (H)     Pt is due for her CT chest angiogram to follow up on her thoracic aorta. Will help schedule         Pressure injury of sacral region, stage 3 (H) - Primary     Discussed wound care referral but she prefers to see some one in the clinic here to have it treated as she does not have a conveyance to go to [place] and she does not qualify for home care . Referred to oni to get recommendations on wound care         Relevant Orders    GENERAL SURG ADULT REFERRAL               See Patient Instruction  Return in about 3 months (around 10/1/2019).    Heidi Inman MD  [facility]"

## 2019-07-05 ENCOUNTER — TELEPHONE (OUTPATIENT)
Dept: SURGERY | Facility: CLINIC | Age: 84
End: 2019-07-05

## 2019-07-05 ENCOUNTER — OFFICE VISIT (OUTPATIENT)
Dept: SURGERY | Facility: OTHER | Age: 84
End: 2019-07-05
Payer: COMMERCIAL

## 2019-07-05 VITALS
HEIGHT: 62 IN | DIASTOLIC BLOOD PRESSURE: 82 MMHG | BODY MASS INDEX: 21.62 KG/M2 | TEMPERATURE: 97.8 F | WEIGHT: 117.5 LBS | SYSTOLIC BLOOD PRESSURE: 130 MMHG

## 2019-07-05 DIAGNOSIS — L89.152 PRESSURE INJURY OF SACRAL REGION, STAGE 2 (H): Primary | ICD-10-CM

## 2019-07-05 PROCEDURE — 99203 OFFICE O/P NEW LOW 30 MIN: CPT | Performed by: SURGERY

## 2019-07-05 ASSESSMENT — MIFFLIN-ST. JEOR: SCORE: 923.29

## 2019-07-05 NOTE — TELEPHONE ENCOUNTER
I could see the patient Thursday 7/11/19 at 11:45 for wound cares.    Judie Early RN on 7/5/2019 at 11:30 AM

## 2019-07-05 NOTE — LETTER
7/5/2019         RE: Colleen Rowan  [address] [facility] [id number]        Dear Colleague,    Thank you for referring your patient, Colleen Rowan, to the [facility]. Please see a copy of my visit note below.    General Surgery Consultation    Colleen Rowan MRN# [id number]   Age: 85 year old YOB: 1934     Reason for consult: Sacral ulcer                        Assessment and Plan:   I was asked to see this patient at the request of Dr. Inman  for evaluation of sacral ulcer.  Colleen Rowan is a 85 year old female who presented with history, exam, laboratory and imaging most consistent with:        ICD-10-CM    1. Pressure injury of sacral region, stage 2 L89.152 WOUND CARE REFERRAL       On he has a pressure ulcer stage II with great epithelial tissue; measure approximately 2 x 2 cm, down to the dermis.  No signs of infection.  No signs of tracking.  No signs of pilonidal disease.  Recommend that patient be seen by wound care for more definitive plan.  Meantime she is to put a 4 x 4 gauze and continue her local wound care.  I recommend to the patient that she is not to sit for longer than an hour at a time; I notified by nursing staff to help set her up for wound care referral.  No additional surgical intervention is needed from me.    I thank Dr. Inman for the opportunity to participate in the patient's care.           Chief Complaint:   Sacral ulcer     History is obtained from the patient         History of Present Illness:   This patient is a 85 year old  female without a significant past medical history who presents with 2 x 2 centimeter pressure ulcer at the sacrum.  Patient stated she first noted this approximately 2 months ago.  Noted some discharge that would stained her furniture and clothing.  She denies any pain around this area.  Yet to see this area that she does not know if the ulcer is getting smaller in size or increasing size.   Stated throughout the summer she is very active.  She is very into gardening and she walks constantly.  However throughout the winter and she is a  thus is not very active.  There are certain days where she would sit for 12 hours plus on her chair watching TV.  This may be why she acquired this ulcer to begin with.  Denies any fevers, chills.  She denies history of antibiotics use around this area.            Past Medical History:    has a past medical history of Aneurysm (H) (), Diverticulosis (Jan 2003), Lump or mass in breast (age 26), Osteopenia, and Septicemia due to Gram-negative organism, unspecified(038.40) (H).          Past Surgical History:     Past Surgical History:   Procedure Laterality Date     C LIGATE FALLOPIAN TUBE  age 22    tubal ligation     FUSION LUMBAR POSTERIOR ARTIFICIAL DISK WITH SYSTEM  08/14/2012    L-1,[facility] EXCISION TUMOR SOFT TISSUE NECK/THORAX IM <5CM  12/30/2003    Excision of intramuscular mass, right posterolateral chest.           Medications:     Current Outpatient Medications on File Prior to Visit:  Ascorbic Acid (VITAMIN C PO) Take 1 tablet by mouth daily.   aspirin 81 MG EC tablet Take 1 tablet by mouth daily.   calcium citrate (CALCITRATE) 950 MG tablet Take 1 tablet by mouth 2 times daily.   Cholecalciferol (VITAMIN D PO) Take  by mouth.   diclofenac (VOLTAREN) 1 % topical gel Place onto the skin 4 times daily   losartan (COZAAR) 50 MG tablet Take 1 tablet (50 mg) by mouth daily   metoprolol succinate (TOPROL-XL) 25 MG 24 hr tablet TAKE 1 TABLET EVERY DAY   Multiple Vitamins-Minerals (OCUVITE PO) Take  by mouth.   omeprazole (PRILOSEC) 20 MG CR capsule TAKE 1 CAPSULE EVERY DAY   VITAMIN E NATURAL PO Take 400 Units by mouth daily     No current facility-administered medications on file prior to visit.       Allergies:      Allergies   Allergen Reactions     Penicillins      hives     No Clinical Screening - See Comments Rash            Social  History:   Colleen Rowan  reports that she has never smoked. She has never used smokeless tobacco. She reports that she does not drink alcohol or use drugs.          Family History:   The patient has no family history of any bleeding, clotting or anesthesia problems.          Review of Systems:     Constitutional: Denies fever or chills   Eyes: Denies change in visual acuity   HENT: Denies nasal congestion or sore throat   Respiratory: Denies cough or shortness of breath   Cardiovascular: Denies chest pain or edema   GI: Denies abdominal pain, nausea, vomiting, bloody stools or diarrhea   : Denies dysuria   Musculoskeletal: Denies back pain or joint pain   Integument: Denies rash   Neurologic: Denies headache, focal weakness or sensory changes   Endocrine: Denies polyuria or polydipsia   Lymphatic: Denies swollen glands   Psychiatric: Denies depression or anxiety          Physical Exam:     Constitutional: Awake, alert, no acute distress.  Eyes:  No scleral icterus.  Conjunctiva are without injection.  ENMT: Mucous membranes moist, dentition and gums are intact.   Neck: Soft, supple, trachea midline.    Endocrine: The thyroid is without masses and mobile with swallow.   Lymphatic: There is no cervical, submandibular, or inguinal adenopathy.  Respiratory: Lungs are clear to auscultation and percussion bilaterally.   Cardiovascular: Regular rate and rhythm. No murmurs, rubs, or gallops.    Abdomen:Non-distended, non-tender, normoactive bowel sounds present, No masses,  or flank tenderness. No hepatosplenomegaly.   Musculoskeletal: No spinal or CVA tenderness. Full range of motion in the upper and lower extremities.    Skin: Noted 2 x 2 centimeter sacral decubitus ulcer with great epithelial tissue.  The ulcer surpasses the dermis.  As of infection.  No sinus tract indicate this is pilonidal disease.  No signs of active drainage or abscess.  No petechia.    Neurologic: Cranial nerves II through XII are grossly  "intact and symmetric.  Psychiatric: The patient is alert and oriented times 3.  The patient's affect is not blunted and mood is appropriate.          Data:   Vital Signs:  /82   Temp 97.8  F (36.6  C) (Temporal)   Ht 1.562 m (5' 1.5\")   Wt 53.3 kg (117 lb 8 oz)   BMI 21.84 kg/m        WBC -   WBC   Date Value Ref Range Status   02/13/2019 7.4 4.0 - 11.0 10e9/L Final   ], HgB - Hemoglobin   Date Value Ref Range Status   02/13/2019 13.8 11.7 - 15.7 g/dL Final   ]   Liver Function Studies -   Recent Labs   Lab Test 02/13/19  0910   PROTTOTAL 7.1   ALBUMIN 3.9   BILITOTAL 0.7   ALKPHOS 62   AST 12   ALT 17     No results found for this or any previous visit (from the past 744 hour(s)).     Katie Antoine DO 7/5/2019 9:39 AM           Again, thank you for allowing me to participate in the care of your patient.        Sincerely,        Katie Antoine MD    "

## 2019-07-05 NOTE — PROGRESS NOTES
General Surgery Consultation    Colleen Rowan MRN# [id number]   Age: 85 year old YOB: 1934     Reason for consult: Sacral ulcer                        Assessment and Plan:   I was asked to see this patient at the request of Dr. Inman  for evaluation of sacral ulcer.  Colleen Rowan is a 85 year old female who presented with history, exam, laboratory and imaging most consistent with:        ICD-10-CM    1. Pressure injury of sacral region, stage 2 L89.152 WOUND CARE REFERRAL       On he has a pressure ulcer stage II with great epithelial tissue; measure approximately 2 x 2 cm, down to the dermis.  No signs of infection.  No signs of tracking.  No signs of pilonidal disease.  Recommend that patient be seen by wound care for more definitive plan.  Meantime she is to put a 4 x 4 gauze and continue her local wound care.  I recommend to the patient that she is not to sit for longer than an hour at a time; I notified by nursing staff to help set her up for wound care referral.  No additional surgical intervention is needed from me.    I thank Dr. Inman for the opportunity to participate in the patient's care.           Chief Complaint:   Sacral ulcer     History is obtained from the patient         History of Present Illness:   This patient is a 85 year old  female without a significant past medical history who presents with 2 x 2 centimeter pressure ulcer at the sacrum.  Patient stated she first noted this approximately 2 months ago.  Noted some discharge that would stained her furniture and clothing.  She denies any pain around this area.  Yet to see this area that she does not know if the ulcer is getting smaller in size or increasing size.  Stated throughout the summer she is very active.  She is very into gardening and she walks constantly.  However throughout the winter and she is a  thus is not very active.  There are certain days where she would sit for 12 hours plus on her  chair watching TV.  This may be why she acquired this ulcer to begin with.  Denies any fevers, chills.  She denies history of antibiotics use around this area.            Past Medical History:    has a past medical history of Aneurysm (H) (), Diverticulosis (Jan 2003), Lump or mass in breast (age 26), Osteopenia, and Septicemia due to Gram-negative organism, unspecified(038.40) (H).          Past Surgical History:     Past Surgical History:   Procedure Laterality Date     C LIGATE FALLOPIAN TUBE  age 22    tubal ligation     FUSION LUMBAR POSTERIOR ARTIFICIAL DISK WITH SYSTEM  08/14/2012    L-1,[facility] EXCISION TUMOR SOFT TISSUE NECK/THORAX IM <5CM  12/30/2003    Excision of intramuscular mass, right posterolateral chest.           Medications:     Current Outpatient Medications on File Prior to Visit:  Ascorbic Acid (VITAMIN C PO) Take 1 tablet by mouth daily.   aspirin 81 MG EC tablet Take 1 tablet by mouth daily.   calcium citrate (CALCITRATE) 950 MG tablet Take 1 tablet by mouth 2 times daily.   Cholecalciferol (VITAMIN D PO) Take  by mouth.   diclofenac (VOLTAREN) 1 % topical gel Place onto the skin 4 times daily   losartan (COZAAR) 50 MG tablet Take 1 tablet (50 mg) by mouth daily   metoprolol succinate (TOPROL-XL) 25 MG 24 hr tablet TAKE 1 TABLET EVERY DAY   Multiple Vitamins-Minerals (OCUVITE PO) Take  by mouth.   omeprazole (PRILOSEC) 20 MG CR capsule TAKE 1 CAPSULE EVERY DAY   VITAMIN E NATURAL PO Take 400 Units by mouth daily     No current facility-administered medications on file prior to visit.       Allergies:      Allergies   Allergen Reactions     Penicillins      hives     No Clinical Screening - See Comments Rash            Social History:   Colleen FORD Harpal  reports that she has never smoked. She has never used smokeless tobacco. She reports that she does not drink alcohol or use drugs.          Family History:   The patient has no family history of any bleeding, clotting or  "anesthesia problems.          Review of Systems:     Constitutional: Denies fever or chills   Eyes: Denies change in visual acuity   HENT: Denies nasal congestion or sore throat   Respiratory: Denies cough or shortness of breath   Cardiovascular: Denies chest pain or edema   GI: Denies abdominal pain, nausea, vomiting, bloody stools or diarrhea   : Denies dysuria   Musculoskeletal: Denies back pain or joint pain   Integument: Denies rash   Neurologic: Denies headache, focal weakness or sensory changes   Endocrine: Denies polyuria or polydipsia   Lymphatic: Denies swollen glands   Psychiatric: Denies depression or anxiety          Physical Exam:     Constitutional: Awake, alert, no acute distress.  Eyes:  No scleral icterus.  Conjunctiva are without injection.  ENMT: Mucous membranes moist, dentition and gums are intact.   Neck: Soft, supple, trachea midline.    Endocrine: The thyroid is without masses and mobile with swallow.   Lymphatic: There is no cervical, submandibular, or inguinal adenopathy.  Respiratory: Lungs are clear to auscultation and percussion bilaterally.   Cardiovascular: Regular rate and rhythm. No murmurs, rubs, or gallops.    Abdomen:Non-distended, non-tender, normoactive bowel sounds present, No masses,  or flank tenderness. No hepatosplenomegaly.   Musculoskeletal: No spinal or CVA tenderness. Full range of motion in the upper and lower extremities.    Skin: Noted 2 x 2 centimeter sacral decubitus ulcer with great epithelial tissue.  The ulcer surpasses the dermis.  As of infection.  No sinus tract indicate this is pilonidal disease.  No signs of active drainage or abscess.  No petechia.    Neurologic: Cranial nerves II through XII are grossly intact and symmetric.  Psychiatric: The patient is alert and oriented times 3.  The patient's affect is not blunted and mood is appropriate.          Data:   Vital Signs:  /82   Temp 97.8  F (36.6  C) (Temporal)   Ht 1.562 m (5' 1.5\")   Wt 53.3 " kg (117 lb 8 oz)   BMI 21.84 kg/m       WBC -   WBC   Date Value Ref Range Status   02/13/2019 7.4 4.0 - 11.0 10e9/L Final   ], HgB - Hemoglobin   Date Value Ref Range Status   02/13/2019 13.8 11.7 - 15.7 g/dL Final   ]   Liver Function Studies -   Recent Labs   Lab Test 02/13/19  0910   PROTTOTAL 7.1   ALBUMIN 3.9   BILITOTAL 0.7   ALKPHOS 62   AST 12   ALT 17     No results found for this or any previous visit (from the past 744 hour(s)).     [facility] Antoine,  7/5/2019 9:39 AM

## 2019-07-05 NOTE — TELEPHONE ENCOUNTER
Called and spoke to patient who again was declining to be seen and expressing her frustrations with having a lot going on in her life and not feeling anyone has done anything for her. I told her Judie RN would be able to see her Thursday at 11:45 in ER as she was against seeing a male for treatment as well. She didn't want more appointments and hung up on me.       Bertha PATEL

## 2019-07-05 NOTE — TELEPHONE ENCOUNTER
Patient has buttocks wound. Stage 2 pressure ulcer. Saw Dr Antoine today. She recommended patient see Wound care nurse.    I spoke with the patient over the phone but she adamantly   declined an appt. She expressed great frustration as she has seen Henny and Dr. Antoine for this issue. Frustrated that nothing has been done yet. She WILL NOT see the wound care nurse. They only thing that will satisfy her at this point is for Luís' to order a prescription for some sort of wound care treatment and patient can do this at home. I attempted to explain the track she has been on so far and why she was referred to each specialist. Patient not satisfied with the explanation. Again, I urged her to reconsider the consult with Per on Monday, patient declined.    Routing to Dr. Antoine as an FYI    Judie Early RN on 7/5/2019 at 10:28 AM

## 2019-07-08 NOTE — TELEPHONE ENCOUNTER
Attempted call today to see if patient would be willing to see me on Thursday in [place]. No answer.    Henny wrote a script for some duoderm  paste to use on the wound. patient to follow up with PCP from what I can see. Closing encounter.    Judie Early RN on 7/8/2019 at 2:00 PM

## 2019-07-08 NOTE — TELEPHONE ENCOUNTER
Patient calling stating she has taken care of her wound on her tailbone herself. She will no longer need our assistance.

## 2019-07-15 PROBLEM — L89.153 PRESSURE INJURY OF SACRAL REGION, STAGE 3 (H): Status: ACTIVE | Noted: 2019-07-15

## 2019-07-15 NOTE — ASSESSMENT & PLAN NOTE
Discussed wound care referral but she prefers to see some one in the clinic here to have it treated as she does not have a conveyance to go to [place] and she does not qualify for home care . Referred to oni to get recommendations on wound care

## 2019-11-11 ENCOUNTER — OFFICE VISIT (OUTPATIENT)
Dept: FAMILY MEDICINE | Facility: OTHER | Age: 84
End: 2019-11-11
Payer: COMMERCIAL

## 2019-11-11 ENCOUNTER — TELEPHONE (OUTPATIENT)
Dept: FAMILY MEDICINE | Facility: OTHER | Age: 84
End: 2019-11-11

## 2019-11-11 VITALS — DIASTOLIC BLOOD PRESSURE: 79 MMHG | SYSTOLIC BLOOD PRESSURE: 115 MMHG | HEART RATE: 88 BPM

## 2019-11-11 DIAGNOSIS — I48.92 ATRIAL FLUTTER, PAROXYSMAL (H): Primary | ICD-10-CM

## 2019-11-11 DIAGNOSIS — R00.2 PALPITATIONS: ICD-10-CM

## 2019-11-11 PROCEDURE — 99215 OFFICE O/P EST HI 40 MIN: CPT | Performed by: FAMILY MEDICINE

## 2019-11-11 PROCEDURE — 93000 ELECTROCARDIOGRAM COMPLETE: CPT | Performed by: FAMILY MEDICINE

## 2019-11-11 RX ORDER — METOPROLOL SUCCINATE 25 MG/1
25 TABLET, EXTENDED RELEASE ORAL DAILY
Qty: 30 TABLET | Refills: 0 | Status: SHIPPED | OUTPATIENT
Start: 2019-11-11 | End: 2019-11-25

## 2019-11-11 ASSESSMENT — PAIN SCALES - GENERAL: PAINLEVEL: NO PAIN (0)

## 2019-11-11 NOTE — PROGRESS NOTES
Subjective     Colleen Rowan is a 85 year old female who presents to clinic today for the following health issues:    HPI   Tachycardia       Duration: Saturday since being out of her Metoprolol    Description (location/character/radiation): Fast heart rate, currently in the 150's in clinic, but has been in the 130's at home    Intensity:  moderate, severe    Accompanying signs and symptoms: Nausea    History (similar episodes/previous evaluation): Yes, when previously running out of medication a few years ago    Precipitating or alleviating factors: None    Therapies tried and outcome: Tried doubling her Cozaar without relief     -------------------------------------    Patient Active Problem List   Diagnosis     Disorder of bone and cartilage     Ganglion     Senile osteoporosis     CAD (coronary artery disease): abn EKG, with history of chest pain ca 1995     HTN, goal below 140/90     Respiratory abnormality     Back pain     Insomnia     Aneurysm of thoracic aorta (H)     Cystocele     Gastrointestinal hemorrhage associated with gastric ulcer     Acute pain of left knee     Acquired spondylolisthesis     Pressure injury of sacral region, stage 3 (H)     Past Surgical History:   Procedure Laterality Date     C LIGATE FALLOPIAN TUBE  age 22    tubal ligation     FUSION LUMBAR POSTERIOR ARTIFICIAL DISK WITH SYSTEM  08/14/2012    L-1,[facility] EXCISION TUMOR SOFT TISSUE NECK/THORAX IM <5CM  12/30/2003    Excision of intramuscular mass, right posterolateral chest.       Social History     Tobacco Use     Smoking status: Never Smoker     Smokeless tobacco: Never Used   Substance Use Topics     Alcohol use: No     Family History   Problem Relation Age of Onset     Allergies Sister         latex     Eye Disorder Sister         cataracts     Arthritis Mother      Hypertension Mother      Cerebrovascular Disease Mother 94     Eye Disorder Mother         cataracts     Osteoporosis Mother         kyphosis      Cancer Father         prostate     Cerebrovascular Disease Father 80     Prostate Cancer Father      Depression Father      Neurologic Disorder Father         Parkinson's     Cancer Maternal Grandmother         uterine ca, and pelvic bone cancer     Cerebrovascular Disease Maternal Grandmother      Cerebrovascular Disease Maternal Grandfather      Asthma Daughter      Thyroid Disease Daughter      Cancer Son 48        non hodgkins lymphoma     C.A.D. No family hx of      Diabetes No family hx of      Breast Cancer No family hx of      Cancer - colorectal No family hx of      Alzheimer Disease No family hx of      Cardiovascular No family hx of      Heart Disease No family hx of      Circulatory No family hx of      Gastrointestinal Disease No family hx of      Genitourinary Problems No family hx of      Lipids No family hx of      Musculoskeletal Disorder No family hx of      Respiratory No family hx of          Current Outpatient Medications   Medication Sig Dispense Refill     Ascorbic Acid (VITAMIN C PO) Take 1 tablet by mouth daily.       aspirin 81 MG EC tablet Take 1 tablet by mouth daily. 1 tablet 0     calcium citrate (CALCITRATE) 950 MG tablet Take 1 tablet by mouth 2 times daily.       Cholecalciferol (VITAMIN D PO) Take  by mouth.       diclofenac (VOLTAREN) 1 % topical gel Place onto the skin 4 times daily 100 g 3     Hydroactive Dressings (DUODERM HYDROACTIVE) PSTE Externally apply 1 Application topically daily (Duoderm hydroactive paste) 30 g 3     losartan (COZAAR) 50 MG tablet Take 1 tablet (50 mg) by mouth daily 10 tablet 0     metoprolol succinate ER (TOPROL-XL) 25 MG 24 hr tablet Take 1 tablet (25 mg) by mouth daily 30 tablet 0     Multiple Vitamins-Minerals (OCUVITE PO) Take  by mouth.       omeprazole (PRILOSEC) 20 MG CR capsule TAKE 1 CAPSULE EVERY DAY 90 capsule 3     VITAMIN E NATURAL PO Take 400 Units by mouth daily       Allergies   Allergen Reactions     Penicillins      hives     No  Clinical Screening - See Comments Rash     Recent Labs   Lab Test 02/13/19  0910 01/19/18  0920 11/18/16  1011 10/23/15  1052  11/05/14  0925  09/03/13  0827   LDL 88  --   --  95  --  57  --  70   HDL 75  --   --  68  --  70  --  62   TRIG 55  --   --  70  --  89  --  89   ALT 17 18 19 22  --  20   < >  --    CR 0.78 0.78 0.97 0.72  --  0.87   < >  --    GFRESTIMATED 69 71 55* 78   < > 62   < >  --    GFRESTBLACK 80 85 66 >90   GFR Calc     < > 75   < >  --    POTASSIUM 5.0 4.9 4.7 4.4  --  4.1   < >  --    TSH  --   --   --   --   --   --   --  3.63    < > = values in this interval not displayed.      BP Readings from Last 3 Encounters:   11/11/19 115/79   07/05/19 130/82   07/01/19 124/66    Wt Readings from Last 3 Encounters:   07/05/19 53.3 kg (117 lb 8 oz)   07/01/19 52.6 kg (116 lb)   04/30/19 54 kg (119 lb)                    Reviewed and updated as needed this visit by Provider  Tobacco  Allergies  Meds  Problems  Med Hx  Surg Hx  Fam Hx         Review of Systems   ROS COMP: Constitutional, HEENT, cardiovascular, pulmonary, GI, , musculoskeletal, neuro, skin, endocrine and psych systems are negative, except as otherwise noted.      Objective    /79   Pulse 88   There is no height or weight on file to calculate BMI.  Physical Exam  Constitutional:       General: She is not in acute distress.     Appearance: Normal appearance. She is normal weight. She is not ill-appearing.   HENT:      Head: Normocephalic and atraumatic.   Eyes:      Extraocular Movements: Extraocular movements intact.      Pupils: Pupils are equal, round, and reactive to light.   Neck:      Musculoskeletal: Normal range of motion and neck supple.   Cardiovascular:      Rate and Rhythm: Regular rhythm. Tachycardia present.      Pulses: Normal pulses.      Heart sounds: Normal heart sounds. No murmur. No friction rub. No gallop.    Pulmonary:      Effort: Pulmonary effort is normal. No respiratory distress.       Breath sounds: Normal breath sounds. No stridor. No wheezing, rhonchi or rales.   Chest:      Chest wall: No tenderness.   Abdominal:      General: Bowel sounds are normal.   Neurological:      Mental Status: She is alert.   Psychiatric:         Mood and Affect: Mood normal.         Behavior: Behavior normal.         Thought Content: Thought content normal.         Judgement: Judgment normal.            Diagnostic Test Results:  Labs reviewed in Epic        Assessment & Plan    Patient is a pleasant 85-year-old with history of coronary artery disease, hypertension, hyperlipidemia, paroxysmal atrial fibrillation presents to the clinic with symptoms of palpitations that started 3 days ago.  She denies any shortness of breath, chest pains, lightheadedness or dizziness.  On presentation she was noted to have a pulse up to 130s.  EKG was performed which showed that she is in atrial flutter.  Since she was evidently asymptomatic except for the palpitations and with an understanding that this episode could have been triggered by her running out of her beta-blocker , I suggested refilling the metoprolol at the [facility] pharmacy and have her take a 50 mg dose in the clinic to see if her rate gets back under control.  She was observed for 30 minutes to an hour after taking the medication with good response.  Her heart rate came down to 88 and so did her blood pressure being recorded at 115/79 at the time of discharge.  Advised to return to clinic in a week to 10 days to ensure resolution of the symptoms.  Patient was stable at discharge.    A total of 40 minutes was spent with the patient today, with greater than 50% of the visit involving counseling and coordination of care.       See Patient Instructions  Return in about 1 week (around 11/18/2019).    Heidi Inman MD  [facility]

## 2019-11-11 NOTE — TELEPHONE ENCOUNTER
Reason for Call:  Medication or medication refill:    Do you use a [facility]?  Name of the pharmacy and phone number for the current request:  [facility] - [phone]    Name of the medication requested: metoprolol    Other request: Patent states that she has been out of her medication and her blood pressure and heart rate is off.     Can we leave a detailed message on this number? YES    Phone number patient can be reached at: Cell number on file:    No relevant phone numbers on file.       Best Time: Any    Call taken on 11/11/2019 at 8:58 AM by Sherrell Gomes

## 2019-11-11 NOTE — PROGRESS NOTES
Patient picked up Metoprolol prescription from pharmacy and took 50 mg at 10 am. Patient drank orange juice and had two packages of crackers.  BP and pulse checked 30 minutes later and were WNL. Patient noted feeling improved as her heart was not pounding any longer. Huddled with RK. Return in 2 weeks for BP check and medication refills with MA. Scheduled. Patient will call sooner if noting a return in elevated BP, irregular HR, or symptoms.     Sandra Cano, RN, BSN

## 2019-11-25 ENCOUNTER — ALLIED HEALTH/NURSE VISIT (OUTPATIENT)
Dept: FAMILY MEDICINE | Facility: OTHER | Age: 84
End: 2019-11-25
Payer: COMMERCIAL

## 2019-11-25 VITALS — HEART RATE: 71 BPM | SYSTOLIC BLOOD PRESSURE: 114 MMHG | DIASTOLIC BLOOD PRESSURE: 77 MMHG

## 2019-11-25 DIAGNOSIS — I10 HTN, GOAL BELOW 140/90: ICD-10-CM

## 2019-11-25 DIAGNOSIS — R00.2 PALPITATIONS: ICD-10-CM

## 2019-11-25 PROCEDURE — 99207 ZZC NO CHARGE NURSE ONLY: CPT

## 2019-11-25 RX ORDER — LOSARTAN POTASSIUM 50 MG/1
50 TABLET ORAL DAILY
Qty: 90 TABLET | Refills: 1 | Status: SHIPPED | OUTPATIENT
Start: 2019-11-25 | End: 2019-12-24

## 2019-11-25 RX ORDER — METOPROLOL SUCCINATE 25 MG/1
25 TABLET, EXTENDED RELEASE ORAL DAILY
Qty: 90 TABLET | Refills: 1 | Status: SHIPPED | OUTPATIENT
Start: 2019-11-25 | End: 2019-12-24

## 2019-11-25 NOTE — PROGRESS NOTES
Colleen Rowan is a 85 year old patient who comes in today for a Blood Pressure check.  Initial BP:  /77   Pulse 71      71  Disposition: follow-up as previously indicated by provider and huddled with RN and okay for patient to leave. Medications sent to our pharmacy. Follow up in 6 months which is scheduled.   Suzie Peralta MA

## 2019-12-23 DIAGNOSIS — R00.2 PALPITATIONS: ICD-10-CM

## 2019-12-23 DIAGNOSIS — I10 HTN, GOAL BELOW 140/90: ICD-10-CM

## 2019-12-24 RX ORDER — METOPROLOL SUCCINATE 25 MG/1
25 TABLET, EXTENDED RELEASE ORAL DAILY
Qty: 90 TABLET | Refills: 1 | Status: SHIPPED | OUTPATIENT
Start: 2019-12-24 | End: 2020-06-01

## 2019-12-24 RX ORDER — LOSARTAN POTASSIUM 50 MG/1
50 TABLET ORAL DAILY
Qty: 90 TABLET | Refills: 1 | Status: SHIPPED | OUTPATIENT
Start: 2019-12-24 | End: 2020-06-01

## 2019-12-24 NOTE — TELEPHONE ENCOUNTER
"Requested Prescriptions   Pending Prescriptions Disp Refills     losartan (COZAAR) 50 MG tablet 90 tablet 1     Sig: Take 1 tablet (50 mg) by mouth daily       Angiotensin-II Receptors Passed - 12/23/2019  2:15 PM        Passed - Last blood pressure under 140/90 in past 12 months     BP Readings from Last 3 Encounters:   11/25/19 114/77   11/11/19 115/79 07/05/19 130/82           Passed - Recent (12 mo) or future (30 days) visit within the authorizing provider's specialty     Patient has had an office visit with the authorizing provider or a provider within the authorizing providers department within the previous 12 mos or has a future within next 30 days. See \"Patient Info\" tab in inbasket, or \"Choose Columns\" in Meds & Orders section of the refill encounter.            Passed - Medication is active on med list        Passed - Patient is age 18 or older        Passed - No active pregnancy on record        Passed - Normal serum creatinine on file in past 12 months     Recent Labs   Lab Test 02/13/19  0910  03/17/15  0915   CR 0.78   < >  --    CREAT  --   --  1.0    < > = values in this interval not displayed.             Passed - Normal serum potassium on file in past 12 months     Recent Labs   Lab Test 02/13/19  0910   POTASSIUM 5.0                    Passed - No positive pregnancy test in past 12 months        metoprolol succinate ER (TOPROL-XL) 25 MG 24 hr tablet 90 tablet 1     Sig: Take 1 tablet (25 mg) by mouth daily       Beta-Blockers Protocol Passed - 12/23/2019  2:15 PM        Passed - Blood pressure under 140/90 in past 12 months     BP Readings from Last 3 Encounters:   11/25/19 114/77   11/11/19 115/79 07/05/19 130/82                 Passed - Patient is age 6 or older        Passed - Recent (12 mo) or future (30 days) visit within the authorizing provider's specialty     Patient has had an office visit with the authorizing provider or a provider within the authorizing providers department within " "the previous 12 mos or has a future within next 30 days. See \"Patient Info\" tab in inbasket, or \"Choose Columns\" in Meds & Orders section of the refill encounter.              Passed - Medication is active on med list          Last OV:  11/11/2019    Prescription approved per [facility] Refill Protocol.    Alejandro Barnes RN, BSN    "

## 2020-01-20 DIAGNOSIS — R00.2 PALPITATIONS: ICD-10-CM

## 2020-01-22 RX ORDER — METOPROLOL SUCCINATE 25 MG/1
TABLET, EXTENDED RELEASE ORAL
Qty: 90 TABLET | Refills: 1 | OUTPATIENT
Start: 2020-01-22

## 2020-01-22 NOTE — TELEPHONE ENCOUNTER
Pending Prescriptions:                       Disp   Refills    metoprolol succinate ER (TOPROL-XL) 25 MG*90 tab*1            Sig: TAKE 1 TABLET EVERY DAY    Sent 12/24/2019 with 6month supply. Refill not appropriate at this time.     Cathy Vuong, MSN, RN

## 2020-05-29 NOTE — PROGRESS NOTES
"Colleen Rowan is a 86 year old female who is being evaluated via a billable telephone visit.      The patient has been notified of following:     \"This telephone visit will be conducted via a call between you and your physician/provider. We have found that certain health care needs can be provided without the need for a physical exam.  This service lets us provide the care you need with a short phone conversation.  If a prescription is necessary we can send it directly to your pharmacy.  If lab work is needed we can place an order for that and you can then stop by our lab to have the test done at a later time.    Telephone visits are billed at different rates depending on your insurance coverage. During this emergency period, for some insurers they may be billed the same as an in-person visit.  Please reach out to your insurance provider with any questions.    If during the course of the call the physician/provider feels a telephone visit is not appropriate, you will not be charged for this service.\"    Patient has given verbal consent for Telephone visit?  Yes    What phone number would you like to be contacted at? [phone]    How would you like to obtain your AVS? Mail a copy    Subjective     Colleen Rowan is a 86 year old female who presents via phone visit today for the following health issues:    HPI     Hypertension Follow-up      Do you check your blood pressure regularly outside of the clinic? Yes     Are you following a low salt diet? Yes    Are your blood pressures ever more than 140 on the top number (systolic) OR more   than 90 on the bottom number (diastolic), for example 140/90? No      How many servings of fruits and vegetables do you eat daily?  2-3    On average, how many sweetened beverages do you drink each day (Examples: soda, juice, sweet tea, etc.  Do NOT count diet or artificially sweetened beverages)?   0    How many days per week do you exercise enough to make your heart beat " "faster? 3 or less    How many minutes a day do you exercise enough to make your heart beat faster? 10 - 19    How many days per week do you miss taking your medication? 0    Annual Wellness Visit    Are you in the first 12 months of your Medicare Part B coverage?  No    Physical Health:    In general, how would you rate your overall physical health? excellent    Outside of work, how many days during the week do you exercise?6-7 days/week    Outside of work, approximately how many minutes a day do you exercise?greater than 60 minutes    If you drink alcohol do you typically have >3 drinks per day or >7 drinks per week? No    Do you usually eat at least 4 servings of fruit and vegetables a day, include whole grains & fiber and avoid regularly eating high fat or \"junk\" foods? Yes    Do you have any problems taking medications regularly? No    Do you have any side effects from medications? none    Needs assistance for the following daily activities: no assistance needed    Which of the following safety concerns are present in your home?  none identified     Hearing impairment: No  In the past 6 months, have you been bothered by leaking of urine? Leaky urine.     There were no vitals taken for this visit.  Weight: Provided by patient -118  Height: Provided by patient-5 1''  BMI:   Blood Pressure: Provided by patient-114/80    Mental Health:    In general, how would you rate your overall mental or emotional health? excellent  PHQ-2 Score:      Do you feel safe in your environment? Yes    Have you ever done Advance Care Planning? (For example, a Health Directive, POLST, or a discussion with a medical provider or your loved ones about your wishes)? No, advance care planning information given to patient to review.  Advanced care planning was discussed at today's visit.    Fall risk:     Cognitive Screenin) Repeat 3 items (Leader, Season, Table)    2) Clock draw: could not complete as this I  3) 1 item recall: Recalls 2 " objects   Results: 3 items recalled: COGNITIVE IMPAIRMENT LESS LIKELY    Mini-CogTM Copyright NESHA Mccartney. Licensed by the author for use in [facility] [place]; reprinted with permission (yaneth@.[facility]). All rights reserved.      Do you have sleep apnea, excessive snoring or daytime drowsiness?: no    Current providers sharing in care for this patient include:   Patient Care Team:  Heidi Inman MD as PCP - General (Family Practice)  Heidi Inman MD as Assigned PCP        Patient Active Problem List   Diagnosis     Disorder of bone and cartilage     Ganglion     Senile osteoporosis     CAD (coronary artery disease): abn EKG, with history of chest pain ca 1995     HTN, goal below 140/90     Respiratory abnormality     Back pain     Insomnia     Aneurysm of thoracic aorta (H)     Cystocele     Gastrointestinal hemorrhage associated with gastric ulcer     Acute pain of left knee     Acquired spondylolisthesis     Pressure injury of sacral region, stage 3 (H)     Past Surgical History:   Procedure Laterality Date     C LIGATE FALLOPIAN TUBE  age 22    tubal ligation     FUSION LUMBAR POSTERIOR ARTIFICIAL DISK WITH SYSTEM  08/14/2012    L-1,[facility] EXCISION TUMOR SOFT TISSUE NECK/THORAX IM <5CM  12/30/2003    Excision of intramuscular mass, right posterolateral chest.       Social History     Tobacco Use     Smoking status: Never Smoker     Smokeless tobacco: Never Used   Substance Use Topics     Alcohol use: No     Family History   Problem Relation Age of Onset     Allergies Sister         latex     Eye Disorder Sister         cataracts     Arthritis Mother      Hypertension Mother      Cerebrovascular Disease Mother 94     Eye Disorder Mother         cataracts     Osteoporosis Mother         kyphosis     Cancer Father         prostate     Cerebrovascular Disease Father 80     Prostate Cancer Father      Depression Father      Neurologic Disorder Father         Parkinson's     Cancer Maternal  Grandmother         uterine ca, and pelvic bone cancer     Cerebrovascular Disease Maternal Grandmother      Cerebrovascular Disease Maternal Grandfather      Asthma Daughter      Thyroid Disease Daughter      Cancer Son 48        non hodgkins lymphoma     C.A.D. No family hx of      Diabetes No family hx of      Breast Cancer No family hx of      Cancer - colorectal No family hx of      Alzheimer Disease No family hx of      Cardiovascular No family hx of      Heart Disease No family hx of      Circulatory No family hx of      Gastrointestinal Disease No family hx of      Genitourinary Problems No family hx of      Lipids No family hx of      Musculoskeletal Disorder No family hx of      Respiratory No family hx of          Current Outpatient Medications   Medication Sig Dispense Refill     Ascorbic Acid (VITAMIN C PO) Take 1 tablet by mouth daily.       aspirin 81 MG EC tablet Take 1 tablet by mouth daily. 1 tablet 0     calcium citrate (CALCITRATE) 950 MG tablet Take 1 tablet by mouth 2 times daily.       Cholecalciferol (VITAMIN D PO) Take  by mouth.       Hydroactive Dressings (DUODERM HYDROACTIVE) PSTE Externally apply 1 Application topically daily (Duoderm hydroactive paste) 30 g 3     losartan (COZAAR) 50 MG tablet Take 1 tablet (50 mg) by mouth daily 90 tablet 1     metoprolol succinate ER (TOPROL-XL) 25 MG 24 hr tablet Take 1 tablet (25 mg) by mouth daily 90 tablet 1     Multiple Vitamins-Minerals (OCUVITE PO) Take  by mouth.       omeprazole (PRILOSEC) 20 MG CR capsule TAKE 1 CAPSULE EVERY DAY 90 capsule 3     VITAMIN E NATURAL PO Take 400 Units by mouth daily       Allergies   Allergen Reactions     Penicillins      hives     No Clinical Screening - See Comments Rash     BP Readings from Last 3 Encounters:   11/25/19 114/77   11/11/19 115/79   07/05/19 130/82    Wt Readings from Last 3 Encounters:   07/05/19 53.3 kg (117 lb 8 oz)   07/01/19 52.6 kg (116 lb)   04/30/19 54 kg (119 lb)                     Reviewed and updated as needed this visit by Provider         Review of Systems   Constitutional, HEENT, cardiovascular, pulmonary, GI, , musculoskeletal, neuro, skin, endocrine and psych systems are negative, except as otherwise noted.       Objective   Reported vitals:  There were no vitals taken for this visit.   healthy, alert and no distress  PSYCH: Alert and oriented times 3; coherent speech, normal   rate and volume, able to articulate logical thoughts, able   to abstract reason, no tangential thoughts, no hallucinations   or delusions  Her affect is normal  RESP: No cough, no audible wheezing, able to talk in full sentences  Remainder of exam unable to be completed due to telephone visits    Diagnostic Test Results:  Labs reviewed in Epic        Assessment/Plan:  1. HTN, goal below 140/90  Well controlled on the current regimen per pt report  Refill   Recheck in 6 months  - losartan (COZAAR) 50 MG tablet; Take 1 tablet (50 mg) by mouth daily  Dispense: 90 tablet; Refill: 1    2. Palpitations  No new sympotms . History of atrial flutter but per pt report she does not have any symptoms  rcontinue metoprolol and aspirin  - metoprolol succinate ER (TOPROL-XL) 25 MG 24 hr tablet; Take 1 tablet (25 mg) by mouth daily  Dispense: 90 tablet; Refill: 1    No follow-ups on file.      Phone call duration: 15  minutes    Heidi Inman MD

## 2020-06-01 ENCOUNTER — VIRTUAL VISIT (OUTPATIENT)
Dept: FAMILY MEDICINE | Facility: OTHER | Age: 85
End: 2020-06-01
Payer: COMMERCIAL

## 2020-06-01 DIAGNOSIS — R00.2 PALPITATIONS: ICD-10-CM

## 2020-06-01 DIAGNOSIS — I10 HTN, GOAL BELOW 140/90: ICD-10-CM

## 2020-06-01 PROCEDURE — 99214 OFFICE O/P EST MOD 30 MIN: CPT | Mod: 95 | Performed by: FAMILY MEDICINE

## 2020-06-01 RX ORDER — METOPROLOL SUCCINATE 25 MG/1
25 TABLET, EXTENDED RELEASE ORAL DAILY
Qty: 90 TABLET | Refills: 1 | Status: SHIPPED | OUTPATIENT
Start: 2020-06-01 | End: 2020-10-30

## 2020-06-01 RX ORDER — LOSARTAN POTASSIUM 50 MG/1
50 TABLET ORAL DAILY
Qty: 90 TABLET | Refills: 1 | Status: SHIPPED | OUTPATIENT
Start: 2020-06-01 | End: 2020-10-15

## 2020-10-14 DIAGNOSIS — I10 HTN, GOAL BELOW 140/90: ICD-10-CM

## 2020-10-15 RX ORDER — LOSARTAN POTASSIUM 50 MG/1
TABLET ORAL
Qty: 90 TABLET | Refills: 0 | Status: SHIPPED | OUTPATIENT
Start: 2020-10-15 | End: 2020-12-22

## 2020-10-15 NOTE — TELEPHONE ENCOUNTER
Pending Prescriptions:                       Disp   Refills    losartan (COZAAR) 50 MG tablet [Pharmacy M*90 tab*1        Sig: TAKE 1 TABLET EVERY DAY      Routing refill request to provider for review/approval because:  Labs not in range:    Creatinine   Date Value Ref Range Status   02/13/2019 0.78 0.52 - 1.04 mg/dL Final     Potassium   Date Value Ref Range Status   02/13/2019 5.0 3.4 - 5.3 mmol/L Final         Dania Guzmán RN

## 2020-10-29 DIAGNOSIS — R00.2 PALPITATIONS: ICD-10-CM

## 2020-10-30 RX ORDER — METOPROLOL SUCCINATE 25 MG/1
25 TABLET, EXTENDED RELEASE ORAL DAILY
Qty: 90 TABLET | Refills: 1 | Status: SHIPPED | OUTPATIENT
Start: 2020-10-30 | End: 2021-05-13

## 2020-10-30 NOTE — TELEPHONE ENCOUNTER
"Requested Prescriptions   Pending Prescriptions Disp Refills     metoprolol succinate ER (TOPROL-XL) 25 MG 24 hr tablet [Pharmacy Med Name: METOPROLOL SUCCINATE ER 25 MG Tablet Extended Release 24 Hour] 90 tablet 1     Sig: TAKE 1 TABLET (25 MG) BY MOUTH DAILY       Beta-Blockers Protocol Passed - 10/29/2020  2:27 PM        Passed - Blood pressure under 140/90 in past 12 months     BP Readings from Last 3 Encounters:   11/25/19 114/77   11/11/19 115/79   07/05/19 130/82                 Passed - Patient is age 6 or older        Passed - Recent (12 mo) or future (30 days) visit within the authorizing provider's specialty     Patient has had an office visit with the authorizing provider or a provider within the authorizing providers department within the previous 12 mos or has a future within next 30 days. See \"Patient Info\" tab in inbasket, or \"Choose Columns\" in Meds & Orders section of the refill encounter.              Passed - Medication is active on med list           Prescription approved per [facility] Refill Protocol.    Alejandro Barnes RN, BSN    "

## 2020-12-21 DIAGNOSIS — I10 HTN, GOAL BELOW 140/90: ICD-10-CM

## 2020-12-22 RX ORDER — LOSARTAN POTASSIUM 50 MG/1
TABLET ORAL
Qty: 90 TABLET | Refills: 0 | Status: SHIPPED | OUTPATIENT
Start: 2020-12-22 | End: 2021-05-13

## 2020-12-23 NOTE — TELEPHONE ENCOUNTER
Pending Prescriptions:                       Disp   Refills    losartan (COZAAR) 50 MG tablet [Pharmacy *90 tab*0            Sig: TAKE 1 TABLET EVERY DAY    Medication is being filled for 1 time paulette refill only due to:  Patient is due for medication check.     Please call and help schedule.  Thank you!    Jose Garza RN  December 22, 2020

## 2021-05-09 ENCOUNTER — TRANSFERRED RECORDS (OUTPATIENT)
Dept: HEALTH INFORMATION MANAGEMENT | Facility: CLINIC | Age: 86
End: 2021-05-09

## 2021-05-09 LAB
ALT SERPL-CCNC: 11 IU/L (ref 8–45)
AST SERPL-CCNC: 16 IU/L (ref 2–40)
CREAT SERPL-MCNC: 0.78 MG/DL (ref 0.57–1.11)
GFR SERPL CREATININE-BSD FRML MDRD: >60 ML/MIN/1.73M2
GLUCOSE SERPL-MCNC: 115 MG/DL (ref 65–100)
POTASSIUM SERPL-SCNC: 3.5 MMOL/L (ref 3.5–5)

## 2021-05-12 NOTE — PROGRESS NOTES
Assessment & Plan       ICD-10-CM    1. Gastroesophageal reflux disease, unspecified whether esophagitis present  K21.9 famotidine (PEPCID) 20 MG tablet   2. Epigastric pain  R10.13 famotidine (PEPCID) 20 MG tablet   3. Current moderate episode of major depressive disorder without prior episode (H)  F32.1 sertraline (ZOLOFT) 50 MG tablet   4. Palpitations  R00.2 metoprolol succinate ER (TOPROL-XL) 25 MG 24 hr tablet   5. HTN, goal below 140/90  I10 losartan (COZAAR) 50 MG tablet     Comprehensive metabolic panel (BMP + Alb, Alk Phos, ALT, AST, Total. Bili, TP)   6. Anxiety  F41.9 sertraline (ZOLOFT) 50 MG tablet   7. Atrial flutter, paroxysmal (H)  I48.92 Lipid panel reflex to direct LDL Fasting     CBC with platelets   8. Insomnia, unspecified type  G47.00 traZODone (DESYREL) 50 MG tablet   9. Weight loss  R63.4 TSH with free T4 reflex   10. Thoracic aortic aneurysm without rupture (H)  I71.2    11. CARDIOVASCULAR SCREENING; LDL GOAL LESS THAN 100  Z13.6 Lipid panel reflex to direct LDL Fasting      1, 2.  Clinically well controlled with famotidine.  Recommended continuation of this.  I also specifically recommended patient that she should take this more regularly rather than only when she is having significant symptoms.  If symptoms recur, will proceed with further work-up.  3, 6.  I did offer counseling for patient, but she refuses.  She was open to trying sertraline.  We will start at a low dose and follow-up in 1 month.  4, 7.  Currently controlled.  Will continue current regimen.  5.  Excellent control.  Will check monitoring labs today.  Discussed that if her blood pressure remains low, we may need to reduce her dosing of losartan.  8.  May be related to #3 and 6.  We will do trial of trazodone.  Watch carefully for negative drug interactions.  Follow-up in 1 month.  9.  Unclear etiology.  We will check some screening labs today.  10.  Longstanding and unchanged for a long period of time, but patient has  not had repeat aneurysm testing for 6 years.  I recommended that she come back to discuss this and to get it set up.  11.  Screening ordered.    Portions of this note were completed using Dragon dictation software.  Although reviewed, there may be typographical and other inadvertent errors that remain.     Review of the result(s) of each unique test - CT chest  Ordering of each unique test  Prescription drug management  56 minutes spent on the date of the encounter doing chart review, history and exam, documentation and further activities per the note       Depression Screening Follow Up    PHQ 5/13/2021   PHQ-9 Total Score 10   Q9: Thoughts of better off dead/self-harm past 2 weeks Not at all       Follow Up Actions Taken  Crisis resource information provided in After Visit Summary  Follow up recommended: 1 month     Patient Instructions   Thank you for visiting [facility]    Continue famotidine to help prevent further stomach symptoms.    If these recur and don't respond to the famotidine, let us know.  We can get you in for additional testing.    Start sertraline to help with your mood.  Let us know if side effects.  If you get stomach issues with it, then take the famotidine and let us know it's bothering you.  We can look at other options.    Can try trazodone to help with sleep.  Can take with melatonin, but not more than 10 mg/day.    Eventually, we should re-evaluate your thoracic aneurysm too.  It's been over 5 years.    I do recommend COVID vaccination too.    Contact us or return if questions or concerns.     Have a nice day!    Dr. Navarrete     Return in about 4 weeks (around 6/10/2021) for Recheck, E-visit, video, or phone visit.      If you need medication refills, please contact your pharmacy 3 days before your prescriptions runs out or download the [facility] cholo for your smart phone. If you are out of refills, your pharmacy will contact contact the clinic.                        "              [facility] [phone]                       Return in about 4 weeks (around 6/10/2021) for Recheck, E-visit, video, or phone visit.    Daniel Navarrete MD, MD  [facility] MADONNA Macias is a 87 year old who presents for the following health issues  accompanied by her daughter.    History of Present Illness       She eats 0-1 servings of fruits and vegetables daily.She consumes 1 sweetened beverage(s) daily.She exercises with enough effort to increase her heart rate 20 to 29 minutes per day.    She is taking medications regularly.       ED/UC Followup:    Facility:  [facility]  Date of visit: 21  Reason for visit: Severe stomach ache  Current Status: feeling much better     Had bleeding gastric ulcer back in 2017. Was told to take omeprazole and was on it for over a year but provider felt like it was no longer needed, but then stopped because was told it can weaken the bones.     Last year felt sick to her stomach, took Prilosec for 14 days and this helped.     Recently felt sick to her stomach again, took Prilosec and tried some 7 up but this did not help. She went into the ER at this point.     When these episodes occur she is very nauseated, has stomach pains, and decreased appetite.   Patient thinks she should only be taking Pepcid when sick. Feels \"perfect\" today.     Of note, her son  many years ago from cancer and these episodes seem to correlate with this time of his passing.     Gets diarrhea with these episodes. Had a few episodes of incontinence. Stools get very slimy, mucous. Denies bloody stools/melena.    No vomiting, just very nauseated.     Can't sleep due to anxiety. Goes to bed at 9:30 PM, wakes up at midnight-1  AM, and is up the rest of the day.   7 years ago and she feels she has gone downhill since. Daughter reports her mom has lots of anxiety. Patient feels she has depression.   Depression runs in the family, " especially on dad's side. Multiple family members deal with depression.        Answers for HPI/ROS submitted by the patient on 5/13/2021   Chronic problems general questions HPI Form  If you checked off any problems, how difficult have these problems made it for you to do your work, take care of things at home, or get along with other people?: Not difficult at all  PHQ9 TOTAL SCORE: 10    Review of Systems   Constitutional, HEENT, cardiovascular, pulmonary, GI, , musculoskeletal, neuro, skin, endocrine and psych systems are negative, except as otherwise noted.  History of macular degeneration.    Six Item Cognitive Impairment Test   (6CIT):      What year is it?                               Correct - 0 points    What month is it?                               Correct - 0 points      Give the patient an address to remember with five components:   [place] ( first and last name - 2 components)   [address]  (number and name of street - 2 components)   [place] ( city - 1 component)      About what time is it (within the hour)? Correct - 0 points    Count backwards from 20 to 1:   One error - 2 points    Say the months of the year in reverse: Correct - 0 points    Repeat the address phrase:   2 errors - 4 points    Total 6CIT Score:      6/28    Interpretation: The 6CIT uses an inverse score and questions are weighted to produce a total out of 28. Scores of 0-7 are considered normal and 8 or more significant.    Advantages The test has high sensitivity without compromising specificity even in mild dementia. It is easy to translate linguistically and culturally.  Disadvantages The main disadvantage is in the scoring and weighting of the test, which is initially confusing, however computer models have simplified this greatly.    Probability Statistics: At the 7/8 cut off: Overall figures sensitivity 90% specificity 100%, in mild dementia sensitivity = 78% , specificity = 100%    Copyright 2000 The [facility]  "[facility]. Courtesy of Dr. Van Esquivel     Objective    /60   Pulse 91   Temp 98.9  F (37.2  C) (Temporal)   Resp 14   Ht 1.557 m (5' 1.3\")   Wt 50.5 kg (111 lb 6.4 oz)   SpO2 98%   BMI 20.84 kg/m    Body mass index is 20.84 kg/m .  Physical Exam   GENERAL: healthy, alert and no distress  EYES: Eyes grossly normal to inspection, PERRL and conjunctivae and sclerae normal  HENT: ear canals and TM's normal, nose and mouth without ulcers or lesions  NECK: no adenopathy, no asymmetry, masses, or scars and thyroid normal to palpation  RESP: lungs clear to auscultation - no rales, rhonchi or wheezes  CV: regular rate and rhythm, normal S1 S2, no S3 or S4, no murmur, click or rub, no peripheral edema and peripheral pulses strong  ABDOMEN: soft, nontender, no hepatosplenomegaly, no masses and bowel sounds normal  MS: no gross musculoskeletal defects noted, no edema  SKIN: no suspicious lesions or rashes  NEURO: Normal strength and tone, mentation intact and speech normal  PSYCH: mentation appears normal, affect normal/bright    Office Visit on 02/13/2019   Component Date Value Ref Range Status     Sodium 02/13/2019 141  133 - 144 mmol/L Final     Potassium 02/13/2019 5.0  3.4 - 5.3 mmol/L Final     Chloride 02/13/2019 104  94 - 109 mmol/L Final     Carbon Dioxide 02/13/2019 28  20 - 32 mmol/L Final     Anion Gap 02/13/2019 9  3 - 14 mmol/L Final     Glucose 02/13/2019 109* 70 - 99 mg/dL Final     Urea Nitrogen 02/13/2019 27  7 - 30 mg/dL Final     Creatinine 02/13/2019 0.78  0.52 - 1.04 mg/dL Final     GFR Estimate 02/13/2019 69  >60 mL/min/[1.73_m2] Final    Comment: Non  GFR Calc  Starting 12/18/2018, serum creatinine based estimated GFR (eGFR) will be   calculated using the Chronic Kidney Disease Epidemiology Collaboration   (CKD-EPI) equation.       GFR Estimate If Black 02/13/2019 80  >60 mL/min/[1.73_m2] Final    Comment:  GFR Calc  Starting 12/18/2018, " serum creatinine based estimated GFR (eGFR) will be   calculated using the Chronic Kidney Disease Epidemiology Collaboration   (CKD-EPI) equation.       Calcium 02/13/2019 9.3  8.5 - 10.1 mg/dL Final     Bilirubin Total 02/13/2019 0.7  0.2 - 1.3 mg/dL Final     Albumin 02/13/2019 3.9  3.4 - 5.0 g/dL Final     Protein Total 02/13/2019 7.1  6.8 - 8.8 g/dL Final     Alkaline Phosphatase 02/13/2019 62  40 - 150 U/L Final     ALT 02/13/2019 17  0 - 50 U/L Final     AST 02/13/2019 12  0 - 45 U/L Final     Cholesterol 02/13/2019 174  <200 mg/dL Final     Triglycerides 02/13/2019 55  <150 mg/dL Final     HDL Cholesterol 02/13/2019 75  >49 mg/dL Final     LDL Cholesterol Calculated 02/13/2019 88  <100 mg/dL Final    Desirable:       <100 mg/dl     Non HDL Cholesterol 02/13/2019 99  <130 mg/dL Final     WBC 02/13/2019 7.4  4.0 - 11.0 10e9/L Final     RBC Count 02/13/2019 4.35  3.8 - 5.2 10e12/L Final     Hemoglobin 02/13/2019 13.8  11.7 - 15.7 g/dL Final     Hematocrit 02/13/2019 43.2  35.0 - 47.0 % Final     MCV 02/13/2019 99  78 - 100 fl Final     MCH 02/13/2019 31.7  26.5 - 33.0 pg Final     MCHC 02/13/2019 31.9  31.5 - 36.5 g/dL Final     RDW 02/13/2019 14.3  10.0 - 15.0 % Final     Platelet Count 02/13/2019 183  150 - 450 10e9/L Final     No results found for this or any previous visit (from the past 24 hour(s)).    Physician Attestation   I, Daniel Navarrete MD, was present with the medical/TAO student who participated in the service and in the documentation of the note.  I have verified the history and personally performed the physical exam and medical decision making.  I agree with the assessment and plan of care as documented in the note.      Items personally reviewed: vitals, labs and exam and agree with the interpretation documented in the note.    Daniel Navarrete MD, MD

## 2021-05-13 ENCOUNTER — OFFICE VISIT (OUTPATIENT)
Dept: FAMILY MEDICINE | Facility: OTHER | Age: 86
End: 2021-05-13
Payer: COMMERCIAL

## 2021-05-13 VITALS
RESPIRATION RATE: 14 BRPM | BODY MASS INDEX: 21.03 KG/M2 | SYSTOLIC BLOOD PRESSURE: 104 MMHG | OXYGEN SATURATION: 98 % | WEIGHT: 111.4 LBS | DIASTOLIC BLOOD PRESSURE: 60 MMHG | HEART RATE: 91 BPM | TEMPERATURE: 98.9 F | HEIGHT: 61 IN

## 2021-05-13 DIAGNOSIS — I71.20 THORACIC AORTIC ANEURYSM WITHOUT RUPTURE (H): ICD-10-CM

## 2021-05-13 DIAGNOSIS — R00.2 PALPITATIONS: ICD-10-CM

## 2021-05-13 DIAGNOSIS — F32.1 CURRENT MODERATE EPISODE OF MAJOR DEPRESSIVE DISORDER WITHOUT PRIOR EPISODE (H): ICD-10-CM

## 2021-05-13 DIAGNOSIS — F41.9 ANXIETY: ICD-10-CM

## 2021-05-13 DIAGNOSIS — Z13.6 CARDIOVASCULAR SCREENING; LDL GOAL LESS THAN 100: ICD-10-CM

## 2021-05-13 DIAGNOSIS — G47.00 INSOMNIA, UNSPECIFIED TYPE: ICD-10-CM

## 2021-05-13 DIAGNOSIS — R63.4 WEIGHT LOSS: ICD-10-CM

## 2021-05-13 DIAGNOSIS — I48.92 ATRIAL FLUTTER, PAROXYSMAL (H): ICD-10-CM

## 2021-05-13 DIAGNOSIS — I10 HTN, GOAL BELOW 140/90: ICD-10-CM

## 2021-05-13 DIAGNOSIS — K21.9 GASTROESOPHAGEAL REFLUX DISEASE, UNSPECIFIED WHETHER ESOPHAGITIS PRESENT: Primary | ICD-10-CM

## 2021-05-13 DIAGNOSIS — R10.13 EPIGASTRIC PAIN: ICD-10-CM

## 2021-05-13 LAB
ALBUMIN SERPL-MCNC: 3.8 G/DL (ref 3.4–5)
ALP SERPL-CCNC: 61 U/L (ref 40–150)
ALT SERPL W P-5'-P-CCNC: 14 U/L (ref 0–50)
ANION GAP SERPL CALCULATED.3IONS-SCNC: 2 MMOL/L (ref 3–14)
AST SERPL W P-5'-P-CCNC: 9 U/L (ref 0–45)
BILIRUB SERPL-MCNC: 0.6 MG/DL (ref 0.2–1.3)
BUN SERPL-MCNC: 26 MG/DL (ref 7–30)
CALCIUM SERPL-MCNC: 9 MG/DL (ref 8.5–10.1)
CHLORIDE SERPL-SCNC: 107 MMOL/L (ref 94–109)
CHOLEST SERPL-MCNC: 188 MG/DL
CO2 SERPL-SCNC: 33 MMOL/L (ref 20–32)
CREAT SERPL-MCNC: 1.12 MG/DL (ref 0.52–1.04)
ERYTHROCYTE [DISTWIDTH] IN BLOOD BY AUTOMATED COUNT: 14 % (ref 10–15)
GFR SERPL CREATININE-BSD FRML MDRD: 44 ML/MIN/{1.73_M2}
GLUCOSE SERPL-MCNC: 97 MG/DL (ref 70–99)
HCT VFR BLD AUTO: 44.6 % (ref 35–47)
HDLC SERPL-MCNC: 69 MG/DL
HGB BLD-MCNC: 14.4 G/DL (ref 11.7–15.7)
LDLC SERPL CALC-MCNC: 102 MG/DL
MCH RBC QN AUTO: 32.1 PG (ref 26.5–33)
MCHC RBC AUTO-ENTMCNC: 32.3 G/DL (ref 31.5–36.5)
MCV RBC AUTO: 100 FL (ref 78–100)
NONHDLC SERPL-MCNC: 119 MG/DL
PLATELET # BLD AUTO: 190 10E9/L (ref 150–450)
POTASSIUM SERPL-SCNC: 4.2 MMOL/L (ref 3.4–5.3)
PROT SERPL-MCNC: 6.6 G/DL (ref 6.8–8.8)
RBC # BLD AUTO: 4.48 10E12/L (ref 3.8–5.2)
SODIUM SERPL-SCNC: 142 MMOL/L (ref 133–144)
TRIGL SERPL-MCNC: 86 MG/DL
TSH SERPL DL<=0.005 MIU/L-ACNC: 1.5 MU/L (ref 0.4–4)
WBC # BLD AUTO: 9.3 10E9/L (ref 4–11)

## 2021-05-13 PROCEDURE — 85027 COMPLETE CBC AUTOMATED: CPT | Performed by: FAMILY MEDICINE

## 2021-05-13 PROCEDURE — 36415 COLL VENOUS BLD VENIPUNCTURE: CPT | Performed by: FAMILY MEDICINE

## 2021-05-13 PROCEDURE — 80061 LIPID PANEL: CPT | Performed by: FAMILY MEDICINE

## 2021-05-13 PROCEDURE — 84443 ASSAY THYROID STIM HORMONE: CPT | Performed by: FAMILY MEDICINE

## 2021-05-13 PROCEDURE — 99215 OFFICE O/P EST HI 40 MIN: CPT | Performed by: FAMILY MEDICINE

## 2021-05-13 PROCEDURE — 80053 COMPREHEN METABOLIC PANEL: CPT | Performed by: FAMILY MEDICINE

## 2021-05-13 RX ORDER — FAMOTIDINE 20 MG/1
20 TABLET, FILM COATED ORAL 2 TIMES DAILY
COMMUNITY
Start: 2021-05-10 | End: 2021-05-13

## 2021-05-13 RX ORDER — METOPROLOL SUCCINATE 25 MG/1
25 TABLET, EXTENDED RELEASE ORAL DAILY
Qty: 90 TABLET | Refills: 1 | Status: SHIPPED | OUTPATIENT
Start: 2021-05-13 | End: 2021-08-06

## 2021-05-13 RX ORDER — FAMOTIDINE 20 MG/1
20 TABLET, FILM COATED ORAL 2 TIMES DAILY
Qty: 180 TABLET | Refills: 3 | Status: SHIPPED | OUTPATIENT
Start: 2021-05-13 | End: 2021-08-06

## 2021-05-13 RX ORDER — LOSARTAN POTASSIUM 50 MG/1
TABLET ORAL
Qty: 90 TABLET | Refills: 0 | Status: SHIPPED | OUTPATIENT
Start: 2021-05-13 | End: 2021-08-06

## 2021-05-13 RX ORDER — TRAZODONE HYDROCHLORIDE 50 MG/1
50 TABLET, FILM COATED ORAL
Qty: 30 TABLET | Refills: 1 | Status: SHIPPED | OUTPATIENT
Start: 2021-05-13 | End: 2021-07-30

## 2021-05-13 ASSESSMENT — ANXIETY QUESTIONNAIRES
IF YOU CHECKED OFF ANY PROBLEMS ON THIS QUESTIONNAIRE, HOW DIFFICULT HAVE THESE PROBLEMS MADE IT FOR YOU TO DO YOUR WORK, TAKE CARE OF THINGS AT HOME, OR GET ALONG WITH OTHER PEOPLE: NOT DIFFICULT AT ALL
7. FEELING AFRAID AS IF SOMETHING AWFUL MIGHT HAPPEN: NOT AT ALL
1. FEELING NERVOUS, ANXIOUS, OR ON EDGE: NEARLY EVERY DAY
GAD7 TOTAL SCORE: 5
2. NOT BEING ABLE TO STOP OR CONTROL WORRYING: NOT AT ALL
5. BEING SO RESTLESS THAT IT IS HARD TO SIT STILL: NOT AT ALL
3. WORRYING TOO MUCH ABOUT DIFFERENT THINGS: SEVERAL DAYS
6. BECOMING EASILY ANNOYED OR IRRITABLE: SEVERAL DAYS

## 2021-05-13 ASSESSMENT — MIFFLIN-ST. JEOR: SCORE: 882.43

## 2021-05-13 ASSESSMENT — PATIENT HEALTH QUESTIONNAIRE - PHQ9
SUM OF ALL RESPONSES TO PHQ QUESTIONS 1-9: 10
10. IF YOU CHECKED OFF ANY PROBLEMS, HOW DIFFICULT HAVE THESE PROBLEMS MADE IT FOR YOU TO DO YOUR WORK, TAKE CARE OF THINGS AT HOME, OR GET ALONG WITH OTHER PEOPLE: NOT DIFFICULT AT ALL
5. POOR APPETITE OR OVEREATING: NOT AT ALL
SUM OF ALL RESPONSES TO PHQ QUESTIONS 1-9: 10

## 2021-05-13 NOTE — PATIENT INSTRUCTIONS
Thank you for visiting [facility]    Continue famotidine to help prevent further stomach symptoms.    If these recur and don't respond to the famotidine, let us know.  We can get you in for additional testing.    Start sertraline to help with your mood.  Let us know if side effects.  If you get stomach issues with it, then take the famotidine and let us know it's bothering you.  We can look at other options.    Can try trazodone to help with sleep.  Can take with melatonin, but not more than 10 mg/day.    Eventually, we should re-evaluate your thoracic aneurysm too.  It's been over 5 years.    I do recommend COVID vaccination too.    Contact us or return if questions or concerns.     Have a nice day!    Dr. Navarrete     Return in about 4 weeks (around 6/10/2021) for Recheck, E-visit, video, or phone visit.      If you need medication refills, please contact your pharmacy 3 days before your prescriptions runs out or download the [facility] cholo for your smart phone. If you are out of refills, your pharmacy will contact contact the clinic.                                     [employer] [facility] [phone]

## 2021-05-14 ASSESSMENT — ANXIETY QUESTIONNAIRES: GAD7 TOTAL SCORE: 5

## 2021-05-14 ASSESSMENT — PATIENT HEALTH QUESTIONNAIRE - PHQ9: SUM OF ALL RESPONSES TO PHQ QUESTIONS 1-9: 10

## 2021-05-15 NOTE — RESULT ENCOUNTER NOTE
Most of your labs are normal.  Your cholesterol is slightly worse than before.  Your kidney function is slightly reduced from before.  Be sure you are drinking plenty of water.  Your kidney function should be rechecked within 6 months.  Sooner if you develop any concerning symptoms.    Have a nice day!    Dr. Navarrete

## 2021-07-01 DIAGNOSIS — F41.9 ANXIETY: ICD-10-CM

## 2021-07-01 DIAGNOSIS — F32.1 CURRENT MODERATE EPISODE OF MAJOR DEPRESSIVE DISORDER WITHOUT PRIOR EPISODE (H): ICD-10-CM

## 2021-07-01 NOTE — TELEPHONE ENCOUNTER
Pending Prescriptions:                       Disp   Refills    sertraline (ZOLOFT) 50 MG tablet           30 tab*1        Sig: Take 0.5 tablets (25 mg) by mouth daily Increase to           50 mg after 1-2 weeks if no side effects.      Routing refill request to provider for review/approval because:  Labs out of range:  [id number]    Tiana Carpenter RN on 7/1/2021 at 2:45 PM

## 2021-07-26 NOTE — PROGRESS NOTES
Assessment & Plan     Diarrhea, unspecified type  This seems to be resolving as she isn't going frequently, hasn't had a BM today. Likely acute infection that has resolved. At this time she can hold of anti-diarrheals, question of she is having acute upper GI bleeding given dark stool but could be due to anti-diarrheals. Consider FIT testing but check CBC to make sure hemoglobin is stable.   Consider additional treatment for her hx of Ulcer she is already on Pepcid but could consider carafate or even zofran just for upper GI upset stomach symptoms but waiting to see if she feels better with some time and with off the Sertrailne as noted below.   Encouraged to stay hydrated and bland diet.   - UA Macro with Reflex to Micro and Culture - lab collect; Future  - UA Macro with Reflex to Micro and Culture - lab collect  - Urine Microscopic Exam  - Urine Culture  - Urine Culture    Acute Cystitis without Hematuria  - UA is suspicious for UTI, she is not having any symptoms, they gave her a single dose of Fosfomycin.  Will wait for UC results to determine treatment since she is afebrile and asymptomatic.     HTN, goal below 140/90  Atrial flutter, paroxysmal (H)  Epigastric pain  Thoracic aortic aneurysm without rupture (H)  Coronary artery disease involving native heart without angina pectoris, unspecified vessel or lesion type  Complicated case as Colleen doesn't want to leave her home and really doesn't want to have much assistance at her home but she is taking medication that is not prescribed.   Consider increase BB to BID dosing to see if this helps her if she is truly having some chest pain related to elevated HR and aneurysm, will discontinue sertraline first and we discussed risks with increasing BB including dropping heart rate which is already low so not ideal, we discussed risks for falls and ultimately what that means for her if she has a head injury or fracture.  Recommended skilled nursing to help with med  management in the time being.  Recommended cardiac work-up for her chest pain when she's more active but she decline this at this time.   - HOME CARE NURSING REFERRAL  - BMP  - CBC    Mood:  - Discontinuing Sertraline, take 1/2 tablet for a week and then stop.   Discontinuing this medication as they have noticed cognitive decline and confusion since starting and Colleen doesn't feel like it has helped her at all.     Return in about 3 weeks (around 8/20/2021) for Medication Re-check.    Options for treatment and follow-up care were reviewed with the patient and/or guardian. Patient and/or guardian engaged in the decision making process and verbalized understanding of the options discussed and agreed with the final plan.    YOMI Giron [facility] MADONNA Macias is a 87 year old who presents for the following health issues  accompanied by her daughter Ivy and daughter in law:Sugey    HPI     ED/UC Followup:    Facility:  [facility]  Date of visit: 721/21  Reason for visit: diarrhea, nausea  Current Status: still feeling nautious, diarrhea has improved now black and pasty. Pt has history of ulcer.          The initial concern with the ER was acute onset of diarrhea, nausea.   She went to the ER, they gave her fluids, found a UTI and gave her a dose of fosfomycin in the ED but did not discharge on antibiotics.   The diarrhea was much more frequent, now not as frequent, last BM was maybe yesterday and very small amount  They note it is very dark in color.    She has a hx of ulcer and they note that since her diagnosis of one that she has had persistent issues with upper abdominal discomfort and upset stomach feeling.   She notes more upset stomach, doesn't really feel hungry and doesn't want to eat much but has been eating and drinking - she really likes lemonade.   She did take some anti-diarrheals, family saw box on counter for this.   Sleeps through night without  getting up.     Daughter brings up concerns because Colleen will take medications more than what is prescribed, they have noticed cognitive decline and confusion since starting on Sertraline.  Colleen lives in her own home and refuses to leave it ever.  She sets up her own medication.  The daughter in law comes to the house weekly to check in.  The other daughters live about 30 minutes away and living in one of their homes isn't feasible.     She takes her metoprolol once per day and if she has chest pain she'll take another, she notes it resolves her pain and that she takes this for her aneurysm and after being outside she can develop pain and then needs to rest and take medication.     Denies fevers, vomiting, blood in stool, dysuria, hematuria, urinary frequency, peripheral edema, chest pain, shortness of breath, cough, wheezing, paresthesias.    Able to eat/drink without worsening symptoms.       Review of Systems   Constitutional, HEENT, cardiovascular, pulmonary, GI, , musculoskeletal, neuro, skin, endocrine and psych systems are negative, except as otherwise noted.      Objective    /70   Pulse 55   Temp 96.8  F (36  C) (Temporal)   Wt 47.6 kg (105 lb)   SpO2 100%   BMI 19.65 kg/m    Body mass index is 19.65 kg/m .  Physical Exam   GENERAL: healthy, alert and no distress  EYES: Eyes grossly normal to inspection, PERRL and conjunctivae and sclerae normal  NECK: no adenopathy, no asymmetry, masses, or scars and thyroid normal to palpation  RESP: lungs clear to auscultation - no rales, rhonchi or wheezes  CV: regular rate and rhythm, normal S1 S2, no S3 or S4, no murmur, click or rub, no peripheral edema and peripheral pulses strong  ABDOMEN: soft, nontender, no hepatosplenomegaly, no masses and bowel sounds normal  MS: no gross musculoskeletal defects noted, no edema  PSYCH: mentation appears normal, judgement and insight intact and appearance well groomed    Results for orders placed or performed in  visit on 07/30/21   UA Macro with Reflex to Micro and Culture - lab collect     Status: Abnormal    Specimen: Urine, Clean Catch   Result Value Ref Range    Color Urine Yellow Colorless, Straw, Light Yellow, Yellow    Appearance Urine Clear Clear    Glucose Urine Negative Negative mg/dL    Bilirubin Urine Small (A) Negative    Ketones Urine Trace (A) Negative mg/dL    Specific Gravity Urine 1.010 1.003 - 1.035    Blood Urine Trace (A) Negative    pH Urine >=9.0 (H) 5.0 - 7.0    Protein Albumin Urine 30  (A) Negative mg/dL    Urobilinogen Urine 0.2 0.2, 1.0 E.U./dL    Nitrite Urine Positive (A) Negative    Leukocyte Esterase Urine Moderate (A) Negative   CBC with platelets     Status: Normal   Result Value Ref Range    WBC Count 8.2 4.0 - 11.0 10e3/uL    RBC Count 4.41 3.80 - 5.20 10e6/uL    Hemoglobin 14.1 11.7 - 15.7 g/dL    Hematocrit 43.6 35.0 - 47.0 %    MCV 99 78 - 100 fL    MCH 32.0 26.5 - 33.0 pg    MCHC 32.3 31.5 - 36.5 g/dL    RDW 14.3 10.0 - 15.0 %    Platelet Count 185 150 - 450 10e3/uL   Urine Microscopic Exam     Status: Abnormal   Result Value Ref Range    Bacteria Urine Many (A) None Seen /HPF    RBC Urine 5-10 (A) 0-2 /HPF /HPF    WBC Urine 10-25 (A) 0-5 /HPF /HPF

## 2021-07-30 ENCOUNTER — OFFICE VISIT (OUTPATIENT)
Dept: FAMILY MEDICINE | Facility: OTHER | Age: 86
End: 2021-07-30
Payer: COMMERCIAL

## 2021-07-30 VITALS
WEIGHT: 105 LBS | HEART RATE: 55 BPM | DIASTOLIC BLOOD PRESSURE: 70 MMHG | SYSTOLIC BLOOD PRESSURE: 122 MMHG | TEMPERATURE: 96.8 F | OXYGEN SATURATION: 100 % | BODY MASS INDEX: 19.65 KG/M2

## 2021-07-30 DIAGNOSIS — N30.00 ACUTE CYSTITIS WITHOUT HEMATURIA: ICD-10-CM

## 2021-07-30 DIAGNOSIS — I48.92 ATRIAL FLUTTER, PAROXYSMAL (H): ICD-10-CM

## 2021-07-30 DIAGNOSIS — I25.10 CORONARY ARTERY DISEASE INVOLVING NATIVE HEART WITHOUT ANGINA PECTORIS, UNSPECIFIED VESSEL OR LESION TYPE: ICD-10-CM

## 2021-07-30 DIAGNOSIS — I10 HTN, GOAL BELOW 140/90: ICD-10-CM

## 2021-07-30 DIAGNOSIS — R19.7 DIARRHEA, UNSPECIFIED TYPE: Primary | ICD-10-CM

## 2021-07-30 DIAGNOSIS — R10.13 EPIGASTRIC PAIN: ICD-10-CM

## 2021-07-30 DIAGNOSIS — I71.20 THORACIC AORTIC ANEURYSM WITHOUT RUPTURE (H): ICD-10-CM

## 2021-07-30 LAB
ALBUMIN UR-MCNC: 30 MG/DL
ANION GAP SERPL CALCULATED.3IONS-SCNC: 5 MMOL/L (ref 3–14)
APPEARANCE UR: CLEAR
BACTERIA #/AREA URNS HPF: ABNORMAL /HPF
BILIRUB UR QL STRIP: ABNORMAL
BUN SERPL-MCNC: 20 MG/DL (ref 7–30)
CALCIUM SERPL-MCNC: 9.2 MG/DL (ref 8.5–10.1)
CHLORIDE BLD-SCNC: 102 MMOL/L (ref 94–109)
CO2 SERPL-SCNC: 32 MMOL/L (ref 20–32)
COLOR UR AUTO: YELLOW
CREAT SERPL-MCNC: 1 MG/DL (ref 0.52–1.04)
ERYTHROCYTE [DISTWIDTH] IN BLOOD BY AUTOMATED COUNT: 14.3 % (ref 10–15)
GFR SERPL CREATININE-BSD FRML MDRD: 51 ML/MIN/1.73M2
GLUCOSE BLD-MCNC: 105 MG/DL (ref 70–99)
GLUCOSE UR STRIP-MCNC: NEGATIVE MG/DL
HCT VFR BLD AUTO: 43.6 % (ref 35–47)
HGB BLD-MCNC: 14.1 G/DL (ref 11.7–15.7)
HGB UR QL STRIP: ABNORMAL
KETONES UR STRIP-MCNC: ABNORMAL MG/DL
LEUKOCYTE ESTERASE UR QL STRIP: ABNORMAL
MCH RBC QN AUTO: 32 PG (ref 26.5–33)
MCHC RBC AUTO-ENTMCNC: 32.3 G/DL (ref 31.5–36.5)
MCV RBC AUTO: 99 FL (ref 78–100)
NITRATE UR QL: POSITIVE
PH UR STRIP: >=9 [PH] (ref 5–7)
PLATELET # BLD AUTO: 185 10E3/UL (ref 150–450)
POTASSIUM BLD-SCNC: 3.8 MMOL/L (ref 3.4–5.3)
RBC # BLD AUTO: 4.41 10E6/UL (ref 3.8–5.2)
RBC #/AREA URNS AUTO: ABNORMAL /HPF
SODIUM SERPL-SCNC: 139 MMOL/L (ref 133–144)
SP GR UR STRIP: 1.01 (ref 1–1.03)
UROBILINOGEN UR STRIP-ACNC: 0.2 E.U./DL
WBC # BLD AUTO: 8.2 10E3/UL (ref 4–11)
WBC #/AREA URNS AUTO: ABNORMAL /HPF

## 2021-07-30 PROCEDURE — 36415 COLL VENOUS BLD VENIPUNCTURE: CPT | Performed by: PHYSICIAN ASSISTANT

## 2021-07-30 PROCEDURE — 87186 SC STD MICRODIL/AGAR DIL: CPT | Performed by: PHYSICIAN ASSISTANT

## 2021-07-30 PROCEDURE — 87086 URINE CULTURE/COLONY COUNT: CPT | Performed by: PHYSICIAN ASSISTANT

## 2021-07-30 PROCEDURE — 99214 OFFICE O/P EST MOD 30 MIN: CPT | Performed by: PHYSICIAN ASSISTANT

## 2021-07-30 PROCEDURE — 87088 URINE BACTERIA CULTURE: CPT | Performed by: PHYSICIAN ASSISTANT

## 2021-07-30 PROCEDURE — 80048 BASIC METABOLIC PNL TOTAL CA: CPT | Performed by: PHYSICIAN ASSISTANT

## 2021-07-30 PROCEDURE — 85027 COMPLETE CBC AUTOMATED: CPT | Performed by: PHYSICIAN ASSISTANT

## 2021-07-30 PROCEDURE — 81001 URINALYSIS AUTO W/SCOPE: CPT | Performed by: PHYSICIAN ASSISTANT

## 2021-07-30 RX ORDER — LANOLIN ALCOHOL/MO/W.PET/CERES
CREAM (GRAM) TOPICAL
COMMUNITY
Start: 2021-05-10 | End: 2022-04-22

## 2021-07-30 NOTE — PATIENT INSTRUCTIONS
- Take 1/2 tablet of Sertraline starting tomorrow - for the next 7 days and then stop taking it.   - Ok for now to take the Metoprolol 25 mg once daily and 1 tablet additionally if needed.   - Follow-up in 3 weeks for re-check. Sooner if the abdominal pain is getting worse or diarrhea returns.

## 2021-08-01 LAB
BACTERIA UR CULT: ABNORMAL
BACTERIA UR CULT: ABNORMAL

## 2021-08-02 ENCOUNTER — TELEPHONE (OUTPATIENT)
Dept: FAMILY MEDICINE | Facility: OTHER | Age: 86
End: 2021-08-02

## 2021-08-02 DIAGNOSIS — N30.00 ACUTE CYSTITIS WITHOUT HEMATURIA: ICD-10-CM

## 2021-08-02 RX ORDER — CEPHALEXIN 500 MG/1
500 CAPSULE ORAL 2 TIMES DAILY
Qty: 14 CAPSULE | Refills: 0 | Status: SHIPPED | OUTPATIENT
Start: 2021-08-02 | End: 2021-08-02

## 2021-08-02 RX ORDER — CEPHALEXIN 500 MG/1
500 CAPSULE ORAL 2 TIMES DAILY
Qty: 14 CAPSULE | Refills: 0 | Status: CANCELLED | OUTPATIENT
Start: 2021-08-02

## 2021-08-02 RX ORDER — CEPHALEXIN 500 MG/1
500 CAPSULE ORAL 2 TIMES DAILY
Qty: 14 CAPSULE | Refills: 0 | Status: SHIPPED | OUTPATIENT
Start: 2021-08-02 | End: 2022-03-11

## 2021-08-02 NOTE — TELEPHONE ENCOUNTER
----- Message from Africa Carbone sent at 8/2/2021  9:58 AM CDT -----  Patient notified of provider response/recommendations.  She would like her prescription sent to [facility] instead of mail order.  Could you please resend.

## 2021-08-04 ENCOUNTER — MEDICAL CORRESPONDENCE (OUTPATIENT)
Dept: HEALTH INFORMATION MANAGEMENT | Facility: CLINIC | Age: 86
End: 2021-08-04

## 2021-08-05 ENCOUNTER — MEDICAL CORRESPONDENCE (OUTPATIENT)
Dept: HEALTH INFORMATION MANAGEMENT | Facility: CLINIC | Age: 86
End: 2021-08-05

## 2021-08-06 ENCOUNTER — TELEPHONE (OUTPATIENT)
Dept: FAMILY MEDICINE | Facility: OTHER | Age: 86
End: 2021-08-06

## 2021-08-06 DIAGNOSIS — I10 HTN, GOAL BELOW 140/90: ICD-10-CM

## 2021-08-06 DIAGNOSIS — K21.9 GASTROESOPHAGEAL REFLUX DISEASE, UNSPECIFIED WHETHER ESOPHAGITIS PRESENT: ICD-10-CM

## 2021-08-06 DIAGNOSIS — R10.13 EPIGASTRIC PAIN: ICD-10-CM

## 2021-08-06 DIAGNOSIS — R00.2 PALPITATIONS: ICD-10-CM

## 2021-08-06 RX ORDER — METOPROLOL SUCCINATE 25 MG/1
25 TABLET, EXTENDED RELEASE ORAL DAILY
Qty: 90 TABLET | Refills: 1 | Status: SHIPPED | OUTPATIENT
Start: 2021-08-06 | End: 2021-12-10

## 2021-08-06 RX ORDER — LOSARTAN POTASSIUM 50 MG/1
TABLET ORAL
Qty: 90 TABLET | Refills: 1 | Status: SHIPPED | OUTPATIENT
Start: 2021-08-06 | End: 2021-12-10

## 2021-08-06 RX ORDER — FAMOTIDINE 20 MG/1
20 TABLET, FILM COATED ORAL 2 TIMES DAILY
Qty: 180 TABLET | Refills: 1 | Status: SHIPPED | OUTPATIENT
Start: 2021-08-06 | End: 2021-12-10

## 2021-08-06 NOTE — TELEPHONE ENCOUNTER
Spoke to Colleen also tried calling monica but could not get a hold of her and unable to leave a voicemail.     Pratima Romano, CMA

## 2021-08-06 NOTE — TELEPHONE ENCOUNTER
When I saw her her diarrhea frequency had improved so wonder if the Cephalexin is making it worse.     Recommend over the counter Pepto or Immodium and increasing her fiber in her diet or taking some Metamucil or Citrucel daily.   Would recommend follow-up in clinic next week for re-check. She may need to see GI or have additional labs done, possible recheck on her UTI and how she is doing since stopping the Sertraline.     She can see me or RK.     Bridger Freeman PA-C

## 2021-08-06 NOTE — TELEPHONE ENCOUNTER
Spoke with Ivy.  State that mom was seen in ED and in clinic.  She is not with her. But she is up all night having diarrhea.  Went at least 10 times yesterday am.  She's called her daughter about 4 times today saying the diarrhea is so bad.     Is there anything she try over the week or what you suggest?  Any OTC?  She is currently taking Keflex.  States the diarrhea never went away.    Ivy  [phone]    [facility].    Nik Palafox, AGUSTINN, RN, PHN  [facility] ~ Registered Nurse  Clinic Triage ~ [place] [facility] [place]  August 6, 2021

## 2021-08-06 NOTE — TELEPHONE ENCOUNTER
Spoke with Ivy.    Will try this over the weekend.  Mood is improving she is more chatty maggy since being off Sertraline.    Can either of you work her in person next week for follow up?    AGUSTIN VergaraN, RN, PHN  [facility] ~ Registered Nurse  Clinic Triage ~ [place] [facility] [place]  August 6, 2021

## 2021-08-06 NOTE — TELEPHONE ENCOUNTER
Ivy daughter calling.states that Colleen has gone through metoprolol and famotidine  #90 since July first.  They are not sure if she's taken then or dropped.  She was unsure.      But they are needed a refill of both.      She has enough for next week.      Would like to switch to [place] and have daughter  medications.    So home care nurse can dispense to make sure meds are more controlled.      Refills sent.    Nik Palafox, AGUSTINN, RN, PHN  [facility] ~ Registered Nurse  Clinic Triage ~ [place] [facility] [place]  August 6, 2021

## 2021-08-13 ENCOUNTER — DOCUMENTATION ONLY (OUTPATIENT)
Dept: CARE COORDINATION | Facility: CLINIC | Age: 86
End: 2021-08-13

## 2021-08-13 NOTE — PROGRESS NOTES
[facility] now requests orders and shares plan of care/discharge summaries for some patients through [employer].  Please REPLY TO THIS MESSAGE OR ROUTE BACK TO THE AUTHOR in order to give authorization for orders when needed.  This is considered a verbal order, you will still receive a faxed copy of orders for signature.  Thank you for your assistance in improving collaboration for our patients.    FYI Patient will be seen next week for OT eval instead of this week.      Lesia Hutchinson RN  [phone]

## 2021-08-15 ENCOUNTER — TRANSFERRED RECORDS (OUTPATIENT)
Dept: HEALTH INFORMATION MANAGEMENT | Facility: CLINIC | Age: 86
End: 2021-08-15

## 2021-08-18 ENCOUNTER — TELEPHONE (OUTPATIENT)
Dept: FAMILY MEDICINE | Facility: OTHER | Age: 86
End: 2021-08-18

## 2021-08-18 NOTE — TELEPHONE ENCOUNTER
Form faxed to [employer]. Form placed in [employer]ity folder in pod #1 filing cabinet.   Suzie Peralta MA

## 2021-08-18 NOTE — TELEPHONE ENCOUNTER
Form faxed to [employer]. Form placed in [employer]ity folder in pod #1 cabinet.   Suzie Peralta MA

## 2021-08-18 NOTE — TELEPHONE ENCOUNTER
Reason for Call:  Form, our goal is to have forms completed with 72 hours, however, some forms may require a visit or additional information.    Type of letter, form or note:  medical    Who is the form from?: Home care    Where did the form come from: form was faxed in    What clinic location was the form placed at?: [facility] [phone]    Where the form was placed: Team B form Bin    What number is listed as a contact on the form?: fax [phone]       Additional comments: Please complete and fax    Call taken on 8/18/2021 at 8:46 AM by Anusha Lindo

## 2021-08-18 NOTE — TELEPHONE ENCOUNTER
Reason for Call:  Form, our goal is to have forms completed with 72 hours, however, some forms may require a visit or additional information.    Type of letter, form or note:  medical    Who is the form from?: Home care    Where did the form come from: form was faxed in    What clinic location was the form placed at?: [facility] [phone]    Where the form was placed: Team B form bin    What number is listed as a contact on the form?: fax [phone]       Additional comments: Please complete and fax    Call taken on 8/18/2021 at 8:48 AM by Anusha Lindo

## 2021-08-19 ENCOUNTER — TELEPHONE (OUTPATIENT)
Dept: FAMILY MEDICINE | Facility: OTHER | Age: 86
End: 2021-08-19

## 2021-08-19 NOTE — TELEPHONE ENCOUNTER
Form faxed to  [employer]. Please return fax to [phone].  Form placed in [employer]ximity folder in pod #1 filing cabinet.     Suzie Peralta MA

## 2021-09-10 ENCOUNTER — TELEPHONE (OUTPATIENT)
Dept: FAMILY MEDICINE | Facility: OTHER | Age: 86
End: 2021-09-10

## 2021-09-10 NOTE — TELEPHONE ENCOUNTER
MYKE Macias is being discharged from [facility] she has meet her goals and her Cdiff is cleared up.   Nurse wanted Bridger Freeman to be aware that Colleen is insisted on driving her car and the agency has concerns about this since she is often confused. Hoping this can be addressed with patient.   No call back necessary

## 2021-09-13 ENCOUNTER — TELEPHONE (OUTPATIENT)
Dept: FAMILY MEDICINE | Facility: OTHER | Age: 86
End: 2021-09-13

## 2021-09-13 NOTE — TELEPHONE ENCOUNTER
Recommend a visit to discuss confusion and driving risks.   Preferable if patient can come with family.     Bridger Freeman PA-C

## 2021-09-13 NOTE — TELEPHONE ENCOUNTER
Called the patient to discuss. The patient states that she is doing well. She feels great. She is eating after CDIFF.   Patient declines being seen in the clinic. Routing to provider BEHZAD.     KAMIALH Webb, RN, PHN  [place] [place]  September 13, 2021

## 2021-09-13 NOTE — TELEPHONE ENCOUNTER
Reason for Call:  Form, our goal is to have forms completed with 72 hours, however, some forms may require a visit or additional information.    Type of letter, form or note:  medical    Who is the form from?: Home care    Where did the form come from: form was faxed in    What clinic location was the form placed at?: [facility] [phone]    Where the form was placed: Team B form Bin    What number is listed as a contact on the form?: fax [phone]       Additional comments: Please complete and fax    Call taken on 9/13/2021 at 1:20 PM by Anusha Lindo

## 2021-09-14 ENCOUNTER — MEDICAL CORRESPONDENCE (OUTPATIENT)
Dept: HEALTH INFORMATION MANAGEMENT | Facility: CLINIC | Age: 86
End: 2021-09-14

## 2021-09-14 ENCOUNTER — TELEPHONE (OUTPATIENT)
Dept: FAMILY MEDICINE | Facility: OTHER | Age: 86
End: 2021-09-14

## 2021-09-14 NOTE — TELEPHONE ENCOUNTER
Provider notes this, can address at any regular check up she has in the future.  If family has concerns then would encourage them to follow-up with patient.     Bridger Freeman PA-C

## 2021-09-14 NOTE — TELEPHONE ENCOUNTER
Reason for Call:  Form, our goal is to have forms completed with 72 hours, however, some forms may require a visit or additional information.    Type of letter, form or note:  medical    Who is the form from?: [facility] needs signature (if other please explain)    Where did the form come from: form was faxed in    What clinic location was the form placed at?: [facility] [phone]    Where the form was placed: Team B Box/Folder    What number is listed as a contact on the form?: [phone]       Additional comments: Please sign and fax to [phone]    Call taken on 9/14/2021 at 3:59 PM by Kallie Velarde

## 2021-12-06 ENCOUNTER — TELEPHONE (OUTPATIENT)
Dept: FAMILY MEDICINE | Facility: OTHER | Age: 86
End: 2021-12-06
Payer: COMMERCIAL

## 2021-12-06 DIAGNOSIS — K21.9 GASTROESOPHAGEAL REFLUX DISEASE, UNSPECIFIED WHETHER ESOPHAGITIS PRESENT: ICD-10-CM

## 2021-12-06 DIAGNOSIS — R10.13 EPIGASTRIC PAIN: ICD-10-CM

## 2021-12-06 DIAGNOSIS — R00.2 PALPITATIONS: ICD-10-CM

## 2021-12-06 DIAGNOSIS — I10 HTN, GOAL BELOW 140/90: ICD-10-CM

## 2021-12-06 NOTE — TELEPHONE ENCOUNTER
Reason for Call:  Form, our goal is to have forms completed with 72 hours, however, some forms may require a visit or additional information.    Type of letter, form or note:  medical    Who is the form from?: Home care    Where did the form come from: form was faxed in    What clinic location was the form placed at?: [facility] [phone]    Where the form was placed: Team B form bin    What number is listed as a contact on the form?: fax [phone]       Additional comments: Please complete and fax    Call taken on 12/6/2021 at 11:12 AM by Anusha Lindo

## 2021-12-06 NOTE — TELEPHONE ENCOUNTER
Form placed in out of office box in Team C  for review/signature of covering provider.    Shannon LEE CMA

## 2021-12-10 RX ORDER — FAMOTIDINE 20 MG/1
20 TABLET, FILM COATED ORAL 2 TIMES DAILY
Qty: 180 TABLET | Refills: 1 | Status: SHIPPED | OUTPATIENT
Start: 2021-12-10 | End: 2022-04-22

## 2021-12-10 RX ORDER — LOSARTAN POTASSIUM 50 MG/1
TABLET ORAL
Qty: 90 TABLET | Refills: 1 | Status: SHIPPED | OUTPATIENT
Start: 2021-12-10 | End: 2022-03-11

## 2021-12-10 RX ORDER — LOSARTAN POTASSIUM 50 MG/1
TABLET ORAL
Qty: 90 TABLET | Refills: 0 | OUTPATIENT
Start: 2021-12-10

## 2021-12-10 RX ORDER — METOPROLOL SUCCINATE 25 MG/1
25 TABLET, EXTENDED RELEASE ORAL DAILY
Qty: 90 TABLET | Refills: 1 | Status: SHIPPED | OUTPATIENT
Start: 2021-12-10 | End: 2022-03-11

## 2021-12-22 ENCOUNTER — TELEPHONE (OUTPATIENT)
Dept: FAMILY MEDICINE | Facility: OTHER | Age: 86
End: 2021-12-22
Payer: COMMERCIAL

## 2021-12-22 NOTE — TELEPHONE ENCOUNTER
Reason for Call:  Form, our goal is to have forms completed with 72 hours, however, some forms may require a visit or additional information.    Type of letter, form or note:  home health certification and plan of care    Who is the form from?: [facility] (if other please explain)    Where did the form come from: form was faxed in    What clinic location was the form placed at?: [facility] [phone]    Where the form was placed: Team  B Box/Folder    What number is listed as a contact on the form?:  [phone]    Additional comments:  Fax [phone]    Call taken on 12/22/2021 at 10:49 AM by Eleanor Stephen

## 2021-12-22 NOTE — TELEPHONE ENCOUNTER
Reason for Call:  Form, our goal is to have forms completed with 72 hours, however, some forms may require a visit or additional information.    Type of letter, form or note:  Home Health Certification    Who is the form from?: [facility] (if other please explain)    Where did the form come from: form was faxed in    What clinic location was the form placed at?: [facility] [phone]    Where the form was placed: Team B box/folder    What number is listed as a contact on the form?:   [phone]       Additional comments:  Fax  [phone]    Call taken on 12/22/2021 at 12:56 PM by Eleanor Stephen

## 2022-03-11 ENCOUNTER — OFFICE VISIT (OUTPATIENT)
Dept: FAMILY MEDICINE | Facility: OTHER | Age: 87
End: 2022-03-11
Payer: COMMERCIAL

## 2022-03-11 ENCOUNTER — TELEPHONE (OUTPATIENT)
Dept: FAMILY MEDICINE | Facility: OTHER | Age: 87
End: 2022-03-11

## 2022-03-11 VITALS
DIASTOLIC BLOOD PRESSURE: 84 MMHG | TEMPERATURE: 98 F | BODY MASS INDEX: 19.41 KG/M2 | HEIGHT: 62 IN | WEIGHT: 105.5 LBS | RESPIRATION RATE: 16 BRPM | SYSTOLIC BLOOD PRESSURE: 138 MMHG | OXYGEN SATURATION: 100 % | HEART RATE: 98 BPM

## 2022-03-11 DIAGNOSIS — R41.81 AGE-RELATED COGNITIVE DECLINE: ICD-10-CM

## 2022-03-11 DIAGNOSIS — F32.4 MAJOR DEPRESSIVE DISORDER WITH SINGLE EPISODE, IN PARTIAL REMISSION (H): ICD-10-CM

## 2022-03-11 DIAGNOSIS — I48.92 ATRIAL FLUTTER, PAROXYSMAL (H): ICD-10-CM

## 2022-03-11 DIAGNOSIS — F33.41 RECURRENT MAJOR DEPRESSIVE DISORDER, IN PARTIAL REMISSION (H): ICD-10-CM

## 2022-03-11 DIAGNOSIS — R00.2 PALPITATIONS: ICD-10-CM

## 2022-03-11 DIAGNOSIS — I10 HTN, GOAL BELOW 140/90: ICD-10-CM

## 2022-03-11 DIAGNOSIS — I71.20 THORACIC AORTIC ANEURYSM WITHOUT RUPTURE (H): ICD-10-CM

## 2022-03-11 DIAGNOSIS — R19.7 DIARRHEA, UNSPECIFIED TYPE: Primary | ICD-10-CM

## 2022-03-11 PROBLEM — L89.153 PRESSURE INJURY OF SACRAL REGION, STAGE 3 (H): Status: RESOLVED | Noted: 2019-07-15 | Resolved: 2022-03-11

## 2022-03-11 PROBLEM — F32.9 MAJOR DEPRESSION, SINGLE EPISODE: Status: ACTIVE | Noted: 2022-03-11

## 2022-03-11 PROBLEM — F33.9 MAJOR DEPRESSION, RECURRENT (H): Status: ACTIVE | Noted: 2022-03-11

## 2022-03-11 LAB
ALBUMIN SERPL-MCNC: 3.6 G/DL (ref 3.4–5)
ALBUMIN UR-MCNC: NEGATIVE MG/DL
ALP SERPL-CCNC: 60 U/L (ref 40–150)
ALT SERPL W P-5'-P-CCNC: 12 U/L (ref 0–50)
ANION GAP SERPL CALCULATED.3IONS-SCNC: 2 MMOL/L (ref 3–14)
APPEARANCE UR: ABNORMAL
AST SERPL W P-5'-P-CCNC: 10 U/L (ref 0–45)
BACTERIA #/AREA URNS HPF: ABNORMAL /HPF
BASOPHILS # BLD AUTO: 0 10E3/UL (ref 0–0.2)
BASOPHILS NFR BLD AUTO: 0 %
BILIRUB SERPL-MCNC: 0.2 MG/DL (ref 0.2–1.3)
BILIRUB UR QL STRIP: NEGATIVE
BUN SERPL-MCNC: 19 MG/DL (ref 7–30)
CALCIUM SERPL-MCNC: 8.5 MG/DL (ref 8.5–10.1)
CHLORIDE BLD-SCNC: 107 MMOL/L (ref 94–109)
CO2 SERPL-SCNC: 29 MMOL/L (ref 20–32)
COLOR UR AUTO: YELLOW
CREAT SERPL-MCNC: 1.08 MG/DL (ref 0.52–1.04)
EOSINOPHIL # BLD AUTO: 0.3 10E3/UL (ref 0–0.7)
EOSINOPHIL NFR BLD AUTO: 4 %
ERYTHROCYTE [DISTWIDTH] IN BLOOD BY AUTOMATED COUNT: 14.4 % (ref 10–15)
GFR SERPL CREATININE-BSD FRML MDRD: 49 ML/MIN/1.73M2
GLUCOSE BLD-MCNC: 98 MG/DL (ref 70–99)
GLUCOSE UR STRIP-MCNC: NEGATIVE MG/DL
HCT VFR BLD AUTO: 40.3 % (ref 35–47)
HGB BLD-MCNC: 13.2 G/DL (ref 11.7–15.7)
HGB UR QL STRIP: ABNORMAL
KETONES UR STRIP-MCNC: ABNORMAL MG/DL
LEUKOCYTE ESTERASE UR QL STRIP: ABNORMAL
LYMPHOCYTES # BLD AUTO: 2.6 10E3/UL (ref 0.8–5.3)
LYMPHOCYTES NFR BLD AUTO: 32 %
MCH RBC QN AUTO: 32.1 PG (ref 26.5–33)
MCHC RBC AUTO-ENTMCNC: 32.8 G/DL (ref 31.5–36.5)
MCV RBC AUTO: 98 FL (ref 78–100)
MONOCYTES # BLD AUTO: 0.8 10E3/UL (ref 0–1.3)
MONOCYTES NFR BLD AUTO: 11 %
NEUTROPHILS # BLD AUTO: 4.2 10E3/UL (ref 1.6–8.3)
NEUTROPHILS NFR BLD AUTO: 53 %
NITRATE UR QL: POSITIVE
PH UR STRIP: 7.5 [PH] (ref 5–7)
PLATELET # BLD AUTO: 196 10E3/UL (ref 150–450)
POTASSIUM BLD-SCNC: 4 MMOL/L (ref 3.4–5.3)
PROT SERPL-MCNC: 6.3 G/DL (ref 6.8–8.8)
RBC # BLD AUTO: 4.11 10E6/UL (ref 3.8–5.2)
RBC #/AREA URNS AUTO: ABNORMAL /HPF
SODIUM SERPL-SCNC: 138 MMOL/L (ref 133–144)
SP GR UR STRIP: 1.02 (ref 1–1.03)
SQUAMOUS #/AREA URNS AUTO: ABNORMAL /LPF
TSH SERPL DL<=0.005 MIU/L-ACNC: 1.33 MU/L (ref 0.4–4)
UROBILINOGEN UR STRIP-ACNC: 0.2 E.U./DL
WBC # BLD AUTO: 7.9 10E3/UL (ref 4–11)
WBC #/AREA URNS AUTO: ABNORMAL /HPF

## 2022-03-11 PROCEDURE — 80053 COMPREHEN METABOLIC PANEL: CPT | Performed by: FAMILY MEDICINE

## 2022-03-11 PROCEDURE — 36415 COLL VENOUS BLD VENIPUNCTURE: CPT | Performed by: FAMILY MEDICINE

## 2022-03-11 PROCEDURE — 99215 OFFICE O/P EST HI 40 MIN: CPT | Performed by: FAMILY MEDICINE

## 2022-03-11 PROCEDURE — 85025 COMPLETE CBC W/AUTO DIFF WBC: CPT | Performed by: FAMILY MEDICINE

## 2022-03-11 PROCEDURE — 81001 URINALYSIS AUTO W/SCOPE: CPT | Performed by: FAMILY MEDICINE

## 2022-03-11 PROCEDURE — 84443 ASSAY THYROID STIM HORMONE: CPT | Performed by: FAMILY MEDICINE

## 2022-03-11 PROCEDURE — 99417 PROLNG OP E/M EACH 15 MIN: CPT | Performed by: FAMILY MEDICINE

## 2022-03-11 PROCEDURE — 87086 URINE CULTURE/COLONY COUNT: CPT | Performed by: FAMILY MEDICINE

## 2022-03-11 RX ORDER — LOSARTAN POTASSIUM 25 MG/1
25 TABLET ORAL DAILY
Qty: 90 TABLET | Refills: 1 | Status: SHIPPED | OUTPATIENT
Start: 2022-03-11 | End: 2022-04-22

## 2022-03-11 RX ORDER — METOPROLOL SUCCINATE 25 MG/1
25 TABLET, EXTENDED RELEASE ORAL DAILY
Qty: 90 TABLET | Refills: 1 | Status: SHIPPED | OUTPATIENT
Start: 2022-03-11 | End: 2022-04-22

## 2022-03-11 ASSESSMENT — PATIENT HEALTH QUESTIONNAIRE - PHQ9
SUM OF ALL RESPONSES TO PHQ QUESTIONS 1-9: 25
10. IF YOU CHECKED OFF ANY PROBLEMS, HOW DIFFICULT HAVE THESE PROBLEMS MADE IT FOR YOU TO DO YOUR WORK, TAKE CARE OF THINGS AT HOME, OR GET ALONG WITH OTHER PEOPLE: VERY DIFFICULT
SUM OF ALL RESPONSES TO PHQ QUESTIONS 1-9: 25

## 2022-03-11 ASSESSMENT — ANXIETY QUESTIONNAIRES
2. NOT BEING ABLE TO STOP OR CONTROL WORRYING: NEARLY EVERY DAY
7. FEELING AFRAID AS IF SOMETHING AWFUL MIGHT HAPPEN: NOT AT ALL
GAD7 TOTAL SCORE: 15
6. BECOMING EASILY ANNOYED OR IRRITABLE: SEVERAL DAYS
GAD7 TOTAL SCORE: 15
5. BEING SO RESTLESS THAT IT IS HARD TO SIT STILL: MORE THAN HALF THE DAYS
7. FEELING AFRAID AS IF SOMETHING AWFUL MIGHT HAPPEN: NOT AT ALL
4. TROUBLE RELAXING: NEARLY EVERY DAY
GAD7 TOTAL SCORE: 15
1. FEELING NERVOUS, ANXIOUS, OR ON EDGE: NEARLY EVERY DAY
3. WORRYING TOO MUCH ABOUT DIFFERENT THINGS: NEARLY EVERY DAY

## 2022-03-11 ASSESSMENT — PAIN SCALES - GENERAL: PAINLEVEL: NO PAIN (0)

## 2022-03-11 NOTE — TELEPHONE ENCOUNTER
Dr. Inman,     We were made aware of a situation involving this patient and her potentially overusing (or discarding?) her medications. Patient has apparently run out of the 3 month supply of famotidine, losartan and metoprolol that we filled last month. Patients daughter (in-law?) states this is an on going issue and she was hoping we could somehow monitor her mothers medication. Upon further review of patients file I found the following notes including my own:    03/11/2022: LONG HISTORY OF OVERUSING/LOSING MEDICATION, DIL REQUEST WE LIMIT TO 1 MONTH AT A TIME, STATES PT TAKES 3 MONTH SUPPLIES IN 3 - 4 WEEKS TIME. STATES MD AWARE OF ISSUE AND IS TRYING TO GET HOME CARE TO SET UP MEDS- PLEASE LET MD KNOW IF PT IS REQUESTING MEDS EARLY    01/31/2022: PATIENT CAME IN LOOKING FOR METOPROLOL, LOSARTAN, AND FAMOTIDINE. ALL WERE FILLED ON 12/15/21 FOR 90 DAYS. PATIENTS SAYS SHE HAS BEEN OUT FOR MONTHS AND HAS NO PILLS ANYWHERE. SHE BROUGHT IN THE BOTTLES DATED 12/15/21 AND SAYS SHE  DOESN'T KNOW WHY SHE DOESN'T HAVE ANY LEFT BUT SHE HAS BEEN OUT FOR A LONG TIME.  SHE WAS GIVEN TEN TABLETS OF METOPROLOL AND 10 TABLETS OF LOSARTAN.  ALL 3 MEDS ARE BEING SEWT UP TO FILL ON 2/7/22 WHEN INS WILL ALLOW TO GO THROUGH AGAIN    12/14/2021: PT CAME IN LOOKING FOR HER METOPROLOL, IT WAS TOON SOON TO FILL BY ONE DAY. WE OFFERED TO GIVE HER A DAYS SUPPLY AND SHE COULD  THE REMAINDER THE NEXT DAY WHEN IT GOES THROUGH. SHE GOT UPSET AND SAID SHE HAS BEEN OUT FOR MONTHS. WE TALKED TO HER DAUGHTER AND SHE SAID THAT [facility] HAS GOTTEN THE MEDS IN THE LAST COUPLE OF MONTHS BUT THE DAUGHTER IS NOT SURE WHAT PT IS DOING WITH HER MEDS. PT LEFT WITHOUT TAKING THE DAYS SUPPLY AND ALSO WENT AND TALKED TO CLINIC ABOUT NOT GETTING HER MEDS ALSO.    (break in time as patient was filling at [place], but daughter states she did this there too)    07/01/2021: [facility] CALLED TO ORDER FAMOTIDINE & METOPROLOL FOR THE PT. I EXPLAINED THAT WE  SENT OUT A 90DS ON 5/18 & THE PT SHOULD HAVE ENOUGH MEDICATION TO GET THROUGH 8/16. SHE STATED THAT THE PT ONLY HAD 5 TABLETS OF THE METOPROLOL REMAINING. TX HER TO [facility].     PATIENT RECEIVED A 90 DAY SUPPLY OF THESE AND IS COMPLETELY OUT OF FAMOTIDINE AND HAS 5 PILLS LEFT OF METOPROLOL.  SPOKE WITH DAUGHTER IN LAW (DIANELYS).  VERIFIED PILL BOTTLES HAVE CORRECT DATES ON THEM, DOSE, AND PATIENT HAS NOT LOST ANY.  DIL SAID SHE WILL START HELPING RICHAR FILL HER MED BOX.  SHE IS ALSO ABOUT 8 DAYS SHORT ON HER SERTRALINE FROM WHAT SHE SHOULD HAVE.  SUBMITTED FEEDBACK    Sorry for the caps, it's how the notes copied over.     I wanted to make sure you were aware of the extent to which this is happening, as there is little we can offer here at the pharmacy to assist with this situation other than fill smaller supplies and monitor for overuse.    Thank you,     Russell Haider, PharmD  [facility]   [phone]

## 2022-03-11 NOTE — TELEPHONE ENCOUNTER
Thank you for the information. I would certainly fill only one month supply at a time and ga her chart if symptoms do not improve.

## 2022-03-11 NOTE — PROGRESS NOTES
Assessment & Plan     Diarrhea, unspecified type  Suspect patient has worsening memory due to which she has been taking excessive medication resulting in her symptoms of diarrhea , nausea and GI upset. She lives alone and otherwise able to function well apart from her inability to manage her medications  Discussed med management services while awaiting results of the labs as ordered to r/o infectious causes of her symptoms  Her urinalysis came back abnormal but I suspect this to be due to a contaminated sample and I  do not want to start her on antibiotics especially with her complaining of intermittent diarrhea with out ruling out c.diff  Also spoke to pharmacy to review her med refill patterns raising concerns for excessive refills with a short period of time with her metoprolol.   - CBC with platelets and differential; Future  - Comprehensive metabolic panel (BMP + Alb, Alk Phos, ALT, AST, Total. Bili, TP); Future  - Enteric Bacteria and Virus Panel by RAFAEL Stool; Future  - Clostridium difficile Toxin B PCR; Future  - Ova and Parasite Exam Routine; Future  - UA with Microscopic reflex to Culture - lab collect; Future  - TSH with free T4 reflex; Future  - Occult blood stool; Future  - CBC with platelets and differential  - Comprehensive metabolic panel (BMP + Alb, Alk Phos, ALT, AST, Total. Bili, TP)  - UA with Microscopic reflex to Culture - lab collect  - TSH with free T4 reflex  - Urine Microscopic Exam  - Urine Culture    Major depressive disorder with single episode, in partial remission (H)  She has not been taking medication. Will hold off on this due to concerns for polypharmacy , med interactions and side effects due to excessive use.     Atrial flutter, paroxysmal (H)  Continue metoprolol.    Thoracic aortic aneurysm without rupture (H)  monitor    HTN, goal below 140/90    - losartan (COZAAR) 25 MG tablet; Take 1 tablet (25 mg) by mouth daily TAKE 1 TABLET EVERY DAY    Palpitations    - metoprolol  "succinate ER (TOPROL-XL) 25 MG 24 hr tablet; Take 1 tablet (25 mg) by mouth daily    Age-related cognitive decline  Advised to stop all OTC medication . Will get carecoordination on board to get her some resources for help with medications  - Primary Care - Care Coordination Referral    Assessment requiring an independent historian(s) - family - daughter and daughter in law  Prescription drug management  80  minutes spent on the date of the encounter doing chart review, patient visit, documentation and discussion with family        See Patient Instructions    Return in about 2 weeks (around 3/25/2022) for ok to use my approval required slot..    Heidi Inman MD  [facility] MADONNA Macias is a 88 year old who presents for the following health issues     HPI   Has been under stress a lot lately. Vieira are hard. Feels sick to the stomach. Feels nauseated. No vomitings. Has had some diarrhea .  She was evaluated for similar symptoms in the emergency department in summer last year and was treated for C. difficile colitis.  She does have a remote history of gastric ulcer with hemorrhage but was noted on endoscopy following chronic NSAID use.  She attributes her current symptoms to possibly the same cause though her symptoms are more consistent with possible colitis versus fecal incontinence.   \"She is accomanied by her daughter and daughter in law who think that the patient has been over doing OTC medications.  Per the report she has been taking Prilosec, omeprazole and Nexium along with Pepcid altogether to help with her abdominal symptoms.  They have tried to do a medical pill dispenser to help regulate her medications however she did not like this and ended up breaking the machine.  Per patient account though she has been a little down due to the winter and the fact that she is not able to continue her gardening.  She seems hopeful and excited about spring and about starting " "gardening again.  Denies any active suicidal thoughts or ideations.  She was previously provided with home health aide and the nurse to check on her medications however she declined this about a year ago.  She reports that she has good neighbors who  check on her frequently and does not think she needs help with her medications.\"      Review of Systems   Constitutional, HEENT, cardiovascular, pulmonary, GI, , musculoskeletal, neuro, skin, endocrine and psych systems are negative, except as otherwise noted.      Objective    /84   Pulse 98   Temp 98  F (36.7  C) (Temporal)   Resp 16   Ht 1.585 m (5' 2.4\")   Wt 47.9 kg (105 lb 8 oz)   SpO2 100%   BMI 19.05 kg/m    Body mass index is 19.05 kg/m .  Physical Exam   GENERAL: healthy, alert and no distress  NECK: no adenopathy, no asymmetry, masses, or scars and thyroid normal to palpation  RESP: lungs clear to auscultation - no rales, rhonchi or wheezes  CV: regular rate and rhythm, normal S1 S2, no S3 or S4, no murmur, click or rub, no peripheral edema and peripheral pulses strong  ABDOMEN: soft, nontender, no hepatosplenomegaly, no masses and bowel sounds normal  MS: no gross musculoskeletal defects noted, no edema                "

## 2022-03-12 ASSESSMENT — PATIENT HEALTH QUESTIONNAIRE - PHQ9: SUM OF ALL RESPONSES TO PHQ QUESTIONS 1-9: 25

## 2022-03-12 ASSESSMENT — ANXIETY QUESTIONNAIRES: GAD7 TOTAL SCORE: 15

## 2022-03-13 LAB — BACTERIA UR CULT: NORMAL

## 2022-03-29 ENCOUNTER — TELEPHONE (OUTPATIENT)
Dept: FAMILY MEDICINE | Facility: OTHER | Age: 87
End: 2022-03-29
Payer: COMMERCIAL

## 2022-03-29 NOTE — TELEPHONE ENCOUNTER
Daughter ivy calls and states mom was taken by ambulance to [facility]. Daughter is frustrated this just seems to be an endless cycle. Wondering about getting a scope ordered for Colleen    Call back for Ivy [phone] ok to leave a message

## 2022-03-30 ENCOUNTER — PATIENT OUTREACH (OUTPATIENT)
Dept: CARE COORDINATION | Facility: CLINIC | Age: 87
End: 2022-03-30
Payer: COMMERCIAL

## 2022-03-30 NOTE — PROGRESS NOTES
Clinic Care Coordination Contact  [facility]       Clinical Data: CHW Outreach  Outreach attempted x 1. The patients mailbox is full and cant leave a message at this time.     Plan: CHW will try to reach patient again in 1-2 business days.    Jacy Mario  Community Health Worker   [facility]  Care Coordination  [place], [place], [place], [place], [facility]  [email or web address]   Office: [phone]

## 2022-03-30 NOTE — LETTER
[facility] CARE COORDINATION  [address]  [place] [address]    March 31, 2022    Colleen FORD [facility]  [address]  [place] [address]      Dear Colleen,    I am a clinic community health worker who works with Heidi Inman MD at [facility]. I have been trying to reach you recently to introduce Clinic Care Coordination and to see if there was anything I could assist you with.  Below is a description of clinic care coordination and how I can further assist you.      The clinic care coordination team is made up of a registered nurse,  and community health worker who understand the health care system. The goal of clinic care coordination is to help you manage your health and improve access to the health care system in the most efficient manner. The team can assist you in meeting your health care goals by providing education, coordinating services, strengthening the communication among your providers and supporting you with any resource needs.    Please feel free to contact me at [phone] with any questions or concerns. We are focused on providing you with the highest-quality healthcare experience possible and that all starts with you.     Sincerely,     Jacy Mario  Community Health Worker   [facility]  Care Coordination  [place], [facility]  [email or web address]   Office: [phone]

## 2022-03-31 NOTE — PROGRESS NOTES
Clinic Care Coordination Contact  [facility]       Clinical Data: CHW Outreach  Outreach attempted x 2. The patients mailbox is full and the CHW can't leave a message at this time.     Plan: CHW will send unable to contact letter with care coordinator contact information via mail. CHW will do no further outreaches at this time.    Jacy Mario  Community Health Worker   [facility]  Care Coordination  [place], [place], [place], [place], [facility]  [email or web address]   Office: [phone]

## 2022-03-31 NOTE — TELEPHONE ENCOUNTER
I called the daughter and discussed concerns. Will have care coordination to reach out to discuss concerns regarding her safety.     Update- she has not responded to calls from .  international unit(s) have concerns about her decisional capacity and her ability to take care of herself and hence a vulnerable adult report has been filed.     Vulnerable adult case number  [id number]

## 2022-04-01 ENCOUNTER — PATIENT OUTREACH (OUTPATIENT)
Dept: CARE COORDINATION | Facility: CLINIC | Age: 87
End: 2022-04-01

## 2022-04-01 ENCOUNTER — OFFICE VISIT (OUTPATIENT)
Dept: FAMILY MEDICINE | Facility: OTHER | Age: 87
End: 2022-04-01
Payer: COMMERCIAL

## 2022-04-01 VITALS
SYSTOLIC BLOOD PRESSURE: 122 MMHG | WEIGHT: 105 LBS | TEMPERATURE: 97.6 F | HEART RATE: 119 BPM | BODY MASS INDEX: 19.32 KG/M2 | HEIGHT: 62 IN | OXYGEN SATURATION: 99 % | DIASTOLIC BLOOD PRESSURE: 68 MMHG | RESPIRATION RATE: 18 BRPM

## 2022-04-01 DIAGNOSIS — R10.84 ABDOMINAL PAIN, GENERALIZED: Primary | ICD-10-CM

## 2022-04-01 DIAGNOSIS — R41.81 AGE-RELATED COGNITIVE DECLINE: ICD-10-CM

## 2022-04-01 DIAGNOSIS — R19.7 DIARRHEA, UNSPECIFIED TYPE: ICD-10-CM

## 2022-04-01 DIAGNOSIS — Z91.148 NON COMPLIANCE W MEDICATION REGIMEN: Primary | ICD-10-CM

## 2022-04-01 LAB
ALBUMIN UR-MCNC: 30 MG/DL
ANION GAP SERPL CALCULATED.3IONS-SCNC: 4 MMOL/L (ref 3–14)
APPEARANCE UR: CLEAR
BACTERIA #/AREA URNS HPF: ABNORMAL /HPF
BILIRUB UR QL STRIP: NEGATIVE
BUN SERPL-MCNC: 11 MG/DL (ref 7–30)
CALCIUM SERPL-MCNC: 9.3 MG/DL (ref 8.5–10.1)
CHLORIDE BLD-SCNC: 104 MMOL/L (ref 94–109)
CO2 SERPL-SCNC: 29 MMOL/L (ref 20–32)
COLOR UR AUTO: YELLOW
CREAT SERPL-MCNC: 0.87 MG/DL (ref 0.52–1.04)
ERYTHROCYTE [DISTWIDTH] IN BLOOD BY AUTOMATED COUNT: 14.5 % (ref 10–15)
GFR SERPL CREATININE-BSD FRML MDRD: 64 ML/MIN/1.73M2
GLUCOSE BLD-MCNC: 123 MG/DL (ref 70–99)
GLUCOSE UR STRIP-MCNC: NEGATIVE MG/DL
HCT VFR BLD AUTO: 46.4 % (ref 35–47)
HEMOCCULT STL QL: NEGATIVE
HGB BLD-MCNC: 15.2 G/DL (ref 11.7–15.7)
HGB UR QL STRIP: ABNORMAL
KETONES UR STRIP-MCNC: NEGATIVE MG/DL
LEUKOCYTE ESTERASE UR QL STRIP: ABNORMAL
MCH RBC QN AUTO: 32 PG (ref 26.5–33)
MCHC RBC AUTO-ENTMCNC: 32.8 G/DL (ref 31.5–36.5)
MCV RBC AUTO: 98 FL (ref 78–100)
NITRATE UR QL: NEGATIVE
PH UR STRIP: 6.5 [PH] (ref 5–7)
PLATELET # BLD AUTO: 205 10E3/UL (ref 150–450)
POTASSIUM BLD-SCNC: 4.3 MMOL/L (ref 3.4–5.3)
RBC # BLD AUTO: 4.75 10E6/UL (ref 3.8–5.2)
RBC #/AREA URNS AUTO: ABNORMAL /HPF
SODIUM SERPL-SCNC: 137 MMOL/L (ref 133–144)
SP GR UR STRIP: 1.02 (ref 1–1.03)
SQUAMOUS #/AREA URNS AUTO: ABNORMAL /LPF
TRANS CELLS #/AREA URNS HPF: ABNORMAL /HPF
UROBILINOGEN UR STRIP-ACNC: 0.2 E.U./DL
WBC # BLD AUTO: 9.9 10E3/UL (ref 4–11)
WBC #/AREA URNS AUTO: ABNORMAL /HPF

## 2022-04-01 PROCEDURE — 82272 OCCULT BLD FECES 1-3 TESTS: CPT | Performed by: FAMILY MEDICINE

## 2022-04-01 PROCEDURE — 85027 COMPLETE CBC AUTOMATED: CPT | Performed by: FAMILY MEDICINE

## 2022-04-01 PROCEDURE — 80048 BASIC METABOLIC PNL TOTAL CA: CPT | Performed by: FAMILY MEDICINE

## 2022-04-01 PROCEDURE — 99214 OFFICE O/P EST MOD 30 MIN: CPT | Performed by: FAMILY MEDICINE

## 2022-04-01 PROCEDURE — 36415 COLL VENOUS BLD VENIPUNCTURE: CPT | Performed by: FAMILY MEDICINE

## 2022-04-01 PROCEDURE — 81001 URINALYSIS AUTO W/SCOPE: CPT | Performed by: FAMILY MEDICINE

## 2022-04-01 RX ORDER — CEPHALEXIN 500 MG/1
500 CAPSULE ORAL 4 TIMES DAILY
COMMUNITY
Start: 2022-03-29 | End: 2022-04-08

## 2022-04-01 RX ORDER — ONDANSETRON 4 MG/1
4 TABLET, ORALLY DISINTEGRATING ORAL EVERY 8 HOURS PRN
COMMUNITY
Start: 2022-03-29 | End: 2022-04-22

## 2022-04-01 ASSESSMENT — PAIN SCALES - GENERAL: PAINLEVEL: MODERATE PAIN (5)

## 2022-04-01 NOTE — PROGRESS NOTES
Clinic Care Coordination Contact  Care Team Conversations    Received a call from Dr. Inman regarding pt and the concern for patients ability to manage her medications and self at home.  Would like me to reach out to daughter to see what current resources are put in place.  Discussed community paramedic and in agreement for referral.     RNCC will outreach to daughter in 1-2 business days.      Trinity TRIANA, RN, PHN, CCM  Primary Clinic Care Coordination    [facility]  Primary Care Clinics  [email or web address]   Office: [phone]  Employed by [facility]

## 2022-04-04 ENCOUNTER — PATIENT OUTREACH (OUTPATIENT)
Dept: FAMILY MEDICINE | Facility: CLINIC | Age: 87
End: 2022-04-04
Payer: COMMERCIAL

## 2022-04-04 ASSESSMENT — ACTIVITIES OF DAILY LIVING (ADL): DEPENDENT_IADLS:: INDEPENDENT

## 2022-04-04 NOTE — PROGRESS NOTES
Clinic Care Coordination Contact    Clinic Care Coordination Contact  OUTREACH    Referral Information:  Referral Source: PCP    Primary Diagnosis: Cognitive Impairment    Chief Complaint   Patient presents with     Clinic Care Coordination - Post Hospital     RNCC assessment-Inital assessment      Universal Utilization:   Clinic Utilization  Difficulty keeping appointments:: Yes  Compliance Concerns: Yes  No-Show Concerns: Yes  No PCP office visit in Past Year: No  Utilization    Hospital Admissions  0             ED Visits  0             No Show Count (past year)  0                Current as of: 4/3/2022 11:15 PM            Clinical Concerns:  Current Medical Concerns:  Called and spoke with daughter Ivy.  Pt lives alone in her own home and is refusing to leave or have assistance. Ivy states she is concerned about her mother and her ability to take her medications.  She has recently been in the emergency room with stomach pains related to taking handfuls of medications at 1 time.  States she recently had a bladder infection and day 3 of her 10-day course of all of her medication was just about gone.  States she has a pill machine but seems to open it up and takes multiple medications at a time.  States at the current time they have taken all her medications except her metoprolol away until some additional resources are able to be put in place.    Current Behavioral Concerns: Amanda reports patient is often confused so the family has  been trying to help her with her finances and to try and get a better picture of what that looks like however, patient refuses to have them help.  Daughter states pt refuses to answer her phone even when they call her.  Patient did have a slums test and did not score well according to Ivy.  There was a vulnerable adult report filed due to patient's confusion and medication overuse the Ivy is not sure of the outcome of the report.  They are very concerned about her mother living  alone in her own home as they live half an hour away.  Patient continues to drive so she is not homebound and does not qualify for home care.  States patient has some investments so is unlikely that she will qualify for financial assistance, but again they are not sure as she will not let them do any financials.  Education Provided to patient: NA  Pain  Pain (GOAL):: No  Health Maintenance Reviewed: Not assessed  Clinical Pathway: None    Medication Management:  Medication review status: Pt is only taking metoprolol per daughter.    Functional Status:  Dependent ADLs:: Independent  Dependent IADLs:: Independent  Bed or wheelchair confined:: No  Mobility Status: Independent  Fallen 2 or more times in the past year?: No  Any fall with injury in the past year?: No    Living Situation:  Current living arrangement:: I live alone  Type of residence:: Private home - no stairs    Lifestyle & Psychosocial Needs:    Social Determinants of Health     Tobacco Use: Low Risk      Smoking Tobacco Use: Never Smoker     Smokeless Tobacco Use: Never Used   Alcohol Use: Not on file   Financial Resource Strain: Not on file   Food Insecurity: Not on file   Transportation Needs: Not on file   Physical Activity: Not on file   Stress: Not on file   Social Connections: Not on file   Intimate Partner Violence: Not on file   Depression: Not at risk     PHQ-2 Score: 2   Housing Stability: Not on file     Diet:: Regular  Inadequate nutrition (GOAL):: No  Tube Feeding: No  Inadequate activity/exercise (GOAL):: No  Significant changes in sleep pattern (GOAL): No  Transportation means:: Regular car     Mental health DX:: Yes  Mental health DX how managed:: None  Mental health management concern (GOAL):: No  Chemical Dependency Status: No Current Concerns  Informal Support system:: Children, Family      Resources and Interventions:  Current Resources:      Community Resources: None  Supplies Currently Used at Home: None  Equipment Currently Used at  Home: none  Employment Status: retired     Advance Care Plan/Directive  Advanced Care Plans/Directives on file:: No  Advanced Care Plan/Directive Status: Not Applicable    Referrals Placed: Other (community paramedic)    Goals        General    To develop a safe medication regime for patient to follow without over medicating     Goal Statement: To develop a safe medication regime for patient to follow without over medicating  Date Goal set: 4/4/2022  Barriers: cognitive impairment, not wanting to leave the home, limited support  Strengths: motivated and engaged  Date to Achieve By: 5/4/2022  Patient expressed understanding of goal: yes  Action steps to achieve this goal:  1. Referral to community paramedic for assessment and recommendations.   2. Work with [facility] on any additional resources  Regular follow up with PCP        Patient/Caregiver understanding: daughter verbalizes understanding.     Outreach Frequency: 2 weeks  Future Appointments              In 2 weeks Heidi Inman MD [facility]          Plan:   RNCC will connect with the [facility] regarding VA report  RNCC will follow up with CPP  RNCC will follow up with daughter in 3-5 business days.     Trinity TRIANA, RN, PHN, CCM  Primary Clinic Care Coordination    [facility]  Primary Care Clinics  [email or web address]   Office: [phone]  Employed by [employer]

## 2022-04-05 ENCOUNTER — PATIENT OUTREACH (OUTPATIENT)
Dept: CARE COORDINATION | Facility: CLINIC | Age: 87
End: 2022-04-05
Payer: COMMERCIAL

## 2022-04-05 NOTE — PROGRESS NOTES
Clinic Care Coordination Contact  Care Team Conversations    Called and spoke with Di Valadez [facility], states she is not able to give me information however she will send a message to the  assigned and have her contact me if able.          Trinity TRIANA, RN, PHN, CCM  Primary Clinic Care Coordination    [facility]  Primary Care Clinics  [email or web address]   Office: [phone]  Employed by [facility] [facility]

## 2022-04-05 NOTE — PROGRESS NOTES
Clinic Care Coordination Contact  Care Team Conversations    Called and spoke with Janae Noble at [facility]. States she was out to see Colleen today and had a good conversation with her. States pt admits to being depressed and lonely all alone at home. She is asking for more visitors to come see her. Also pt denies any problems with her medications.  Janae states she saw a pill box but it was empty. Explained she is not homebound for home care and community parametrics do not come out his far. Discussed senior  and will try and arrange some community resources for this.  Once Janae has sent a request for a [place] assessment but will be weeks out.  She will follow up if any new developments.     Trinity TRIANA, RN, PHN, CCM  Primary Clinic Care Coordination    [facility]  Primary Care Clinics  [email or web address]   Office: [phone]  Employed by [employer]

## 2022-04-11 PROBLEM — R41.81 AGE-RELATED COGNITIVE DECLINE: Status: ACTIVE | Noted: 2022-04-11

## 2022-04-19 ENCOUNTER — PATIENT OUTREACH (OUTPATIENT)
Dept: FAMILY MEDICINE | Facility: CLINIC | Age: 87
End: 2022-04-19
Payer: COMMERCIAL

## 2022-04-19 NOTE — PROGRESS NOTES
Clinic Care Coordination Contact  Care Team Conversations    Left message for Janae LATIF To return my call.       Trinity TRIANA, RN, PHN, CCM  Primary Clinic Care Coordination    [facility]  Primary Care Clinics  [email or web address]   Office: [phone]  Employed by [employer]

## 2022-04-19 NOTE — PROGRESS NOTES
Clinic Care Coordination Contact    Follow Up Progress Note      Assessment: Called and spoke with Ivy patients daughter, she states she did talk [facility].  She also spoke with her mom and states her mom and she states two people came over to her house yesterday. States she just laid on the couch while they were there.  She did not say who they were or what they were doing.  Daughter states pt does have an appt this week with PCP to discuss medications.  No current safe medication plan in place as of yet. Will connect with Janaewilfredo Noble at the [facility] to see who may have come over.     Care Gaps:    Health Maintenance Due   Topic Date Due     DEPRESSION ACTION PLAN  Never done     ZOSTER IMMUNIZATION (1 of 2) Never done     Pneumococcal Vaccine: 65+ Years (1 of 1 - PPSV23) Never done     INFLUENZA VACCINE (1) 09/01/2021     DTAP/TDAP/TD IMMUNIZATION (2 - Td or Tdap) 11/04/2021     COVID-19 Vaccine (2 - Booster for Yen series) 12/01/2021     LIPID  05/13/2022       Goals addressed this encounter:    Goals       To develop a safe medication regime for patient to follow without over medicating      Goal Statement: To develop a safe medication regime for patient to follow without over medicating  Date Goal set: 4/4/2022  Barriers: cognitive impairment, not wanting to leave the home, limited support  Strengths: motivated and engaged  Date to Achieve By: 5/4/2022  Patient expressed understanding of goal: yes  Action steps to achieve this goal:.   1. Work with [facility] on any additional resources  2. 2.Regular follow up with PCP        Intervention/Education provided during outreach: RNCC will follow up with Janae at at the [facility].      Outreach Frequency: 2 weeks    Plan:   Care Coordinator will follow up in two weeks.     Trinity TRIANA, RN, PHN, CCM  Primary Clinic Care Coordination    [facility]  Primary Care Clinics  [email or web address]   Office: [phone]  Employed by  [employer]

## 2022-04-20 NOTE — PROGRESS NOTES
Clinic Care Coordination Contact  Care Team Conversations      Spoke with Janae Noble at [facility], she states they were out yesterday to visit with pt. States pt is not interested in senior  at this time. Patient feels she really doesn't need any help and that she has friends and family that come visit.  unfortunately there are no current resources for medication management as pt is not home bound.  Janae states they are actually going to close her case as she does not feel she is in danger currently and has the capacity to make her own decisions.   Pt does have an appt coming up with PCP and they will be discussing medications at that time.       RNCC will follow up in 1-2 weeks      Trinity TRIANA, RN, PHN, CCM  Primary Clinic Care Coordination    [facility]  Primary Care Clinics  [email or web address]   Office: [phone]  Employed by [facility] [employer] [facility]

## 2022-04-21 NOTE — PROGRESS NOTES
"  Assessment & Plan        Failure to thrive in adult/   Late onset Alzheimer's dementia without behavioral disturbance (H)/  Variable compliance with medication therapy  Patient is a pleasant 87 y/o with history of aortic aneurysm , Atrial flutter , Hypertension, coronary artery disease with early signs of alzheimer's dementia is accompanied by her daughter and daughter in law to discuss safety concerns and symptoms of vague abdominal symptoms. She is otherwise very highly functioning senior who lives by herself, still drives , enjoys gardening and very much has the capacity to make her decisions but seems to struggle with her med management. Family has tried  But failed to help her stay compliant. Even pharmacy has noted significant concerns about filling prescriptions worth 3 month that she would run out within less than a month raising concerns for over use due to being forgetful. I reached out to care coordination to get [facility] services for med management and getting a  which patient has declined. At this point I think I would be causing more harm than benefit by prescribing  blood pressures medication if they are being administered with out being monitored. She does not wish to persue any invasive procedure to have her current abdominal pain evaluated. I discussed the possibility of the differential being some thing as common like gastritis vs a mass (especially with her weight loss of more than 10 pounds in the last month) . She understands the risk and does not wish to persue evaluation at this time. She would like to stay home and enjoy her gardening until her last days if she can stating\" I have lived long enough and I wish I could pass \" I had an extensive discussion with patient in the presence of her daughter who is assigned HCA and completed POLST. Updated chart with DNR/DNI . Will consider hospice if she continues to decline. Advised against driving any further. She agrees to this plan.    I " "spent a total of 57 minutes on the day of the visit.   Time spent doing chart review, history and exam, documentation and further activities per the note       See Patient Instructions    No follow-ups on file.    Heidi Inman MD  [facility] SHANTELL Macias is a 88 year old who presents for the following health issues  accompanied by her daughter.    HPI     \"Does not feel good, her stomach is sick, then does not like to eat.  Patient is only taking the Metoprolol. However she took two of them today.   Not taking any vitamins, or losartan  She wishes she would just die. \"  Accompanied by family to discuss goals of care        Review of Systems   Constitutional, HEENT, cardiovascular, pulmonary, gi and gu systems are negative, except as otherwise noted.      Objective    /84   Pulse 96   Temp 97.3  F (36.3  C) (Temporal)   Resp 20   Wt 42.4 kg (93 lb 8 oz)   SpO2 99%   BMI 16.88 kg/m    Body mass index is 16.88 kg/m .  Physical Exam   GENERAL: healthy, alert and no distress  NECK: no adenopathy, no asymmetry, masses, or scars and thyroid normal to palpation  RESP: lungs clear to auscultation - no rales, rhonchi or wheezes  CV: regular rate and rhythm, normal S1 S2, no S3 or S4, no murmur, click or rub, no peripheral edema and peripheral pulses strong  ABDOMEN: soft, nontender, no hepatosplenomegaly, no masses and bowel sounds normal  MS: no gross musculoskeletal defects noted, no edema  PSYCH: mentation appears normal, affect normal/bright          "

## 2022-04-22 ENCOUNTER — OFFICE VISIT (OUTPATIENT)
Dept: FAMILY MEDICINE | Facility: OTHER | Age: 87
End: 2022-04-22
Payer: COMMERCIAL

## 2022-04-22 VITALS
BODY MASS INDEX: 16.88 KG/M2 | RESPIRATION RATE: 20 BRPM | OXYGEN SATURATION: 99 % | TEMPERATURE: 97.3 F | HEART RATE: 96 BPM | SYSTOLIC BLOOD PRESSURE: 120 MMHG | WEIGHT: 93.5 LBS | DIASTOLIC BLOOD PRESSURE: 84 MMHG

## 2022-04-22 DIAGNOSIS — R62.7 FAILURE TO THRIVE IN ADULT: ICD-10-CM

## 2022-04-22 DIAGNOSIS — Z13.220 SCREENING FOR HYPERLIPIDEMIA: ICD-10-CM

## 2022-04-22 DIAGNOSIS — G30.1 LATE ONSET ALZHEIMER'S DEMENTIA WITHOUT BEHAVIORAL DISTURBANCE (H): ICD-10-CM

## 2022-04-22 DIAGNOSIS — F02.80 LATE ONSET ALZHEIMER'S DEMENTIA WITHOUT BEHAVIORAL DISTURBANCE (H): ICD-10-CM

## 2022-04-22 DIAGNOSIS — Z91.148 VARIABLE COMPLIANCE WITH MEDICATION THERAPY: Primary | ICD-10-CM

## 2022-04-22 PROCEDURE — 99215 OFFICE O/P EST HI 40 MIN: CPT | Performed by: FAMILY MEDICINE

## 2022-04-29 ENCOUNTER — DOCUMENTATION ONLY (OUTPATIENT)
Dept: OTHER | Facility: CLINIC | Age: 87
End: 2022-04-29
Payer: COMMERCIAL

## 2022-05-10 ENCOUNTER — PATIENT OUTREACH (OUTPATIENT)
Dept: CARE COORDINATION | Facility: CLINIC | Age: 87
End: 2022-05-10
Payer: COMMERCIAL

## 2022-05-10 NOTE — PROGRESS NOTES
Clinic Care Coordination Contact    Situation: Patient chart reviewed by care coordinator.    Background: RN CC has been working with patient's daughter to try and figure out how to better manage her medications.  Patient has not been homebound therefore does not qualify for any home care services, will reach to community paramedic as they are unable as they do not have them in this community.  Had offered companionship patient also declined.    Assessment: Patient recently had appointment with PCP patient will not be taking any medications she will be monitored for decline by PCP.    Plan/Recommendations: RN CC will remain available for any resources needed in the future the patient is agreeable to.  We will follow-up in 2 months.    Trinity TRIANA, RN, PHN, CCM  Primary Clinic Care Coordination    [facility]  Primary Care Clinics  [email or web address]   Office: [phone]  Employed by [facility]

## 2022-07-14 ENCOUNTER — PATIENT OUTREACH (OUTPATIENT)
Dept: CARE COORDINATION | Facility: CLINIC | Age: 87
End: 2022-07-14

## 2022-07-14 NOTE — PROGRESS NOTES
Clinic Care Coordination Contact  [facility]/Voicemail       Clinical Data: Care Coordinator Outreach  Outreach attempted x 1.  Left message on patient's daughter Ivy's voicemail with call back information and requested return call.  Plan: Care Coordinator will try to reach patient again in 10 business days.    Faby Bruno RN Care Coordination   [facility]  Email: [email or web address]  Phone: [phone]

## 2022-07-21 ENCOUNTER — TELEPHONE (OUTPATIENT)
Dept: FAMILY MEDICINE | Facility: OTHER | Age: 87
End: 2022-07-21

## 2022-07-21 DIAGNOSIS — I48.92 ATRIAL FLUTTER, PAROXYSMAL (H): ICD-10-CM

## 2022-07-21 DIAGNOSIS — R62.7 FAILURE TO THRIVE IN ADULT: Primary | ICD-10-CM

## 2022-07-21 NOTE — TELEPHONE ENCOUNTER
Reason for Call: Request for an order or referral:    Order or referral being requested: Hospice order    Date needed: as soon as possible    Has the patient been seen by the PCP for this problem? YES    Additional comments: Daughter called request hospice order be fax to [facility]   Fax: [phone]     Phone number Patient can be reached at:  Home number on file [phone] (home)    Best Time:  anytime    Can we leave a detailed message on this number?  YES    Call taken on 7/21/2022 at 11:41 AM by Leona Collado

## 2022-07-28 ENCOUNTER — PATIENT OUTREACH (OUTPATIENT)
Dept: CARE COORDINATION | Facility: CLINIC | Age: 87
End: 2022-07-28

## 2022-07-28 NOTE — PROGRESS NOTES
Clinic Care Coordination Contact  [facility]/Voicemail       Clinical Data: Care Coordinator Outreach  Outreach attempted x 2.  Left message on patient's daughter Ivy's voicemail with call back information and requested return call.  Plan: Care Coordinator will try to reach patient again in 10 business days.    RN CC does see 7/21/2022 a hospice referral was placed. RN CC was unable to verify if patient was enrolled in hospice. RN CC will chart review in 2 weeks.     Faby Bruno RN Care Coordination   [facility]  Email: [email or web address]  Phone: [phone]

## 2022-07-28 NOTE — TELEPHONE ENCOUNTER
Daughter in law called in and needs a referral for hospice along with medical records. Faxed to info below. Call Shayy [phone] once complete.

## 2022-08-05 ENCOUNTER — TELEPHONE (OUTPATIENT)
Dept: FAMILY MEDICINE | Facility: OTHER | Age: 87
End: 2022-08-05

## 2022-08-05 NOTE — TELEPHONE ENCOUNTER
Order/Referral Request    Who is requesting: [facility]    Orders being requested: admit to memory care - previous medication list with MD signature on this, also need ICD-10 codes, most recent H & P with last year worth of visit notes    Reason service is needed/diagnosis: ?    When are orders needed by: arip    Has this been discussed with Provider: ?    Does patient have a preference on a Group/Provider/Facility? [facility]    Does patient have an appointment scheduled?: No    Where to send orders: Fax - [phone]    Okay to leave a detailed message?:

## 2022-08-08 NOTE — TELEPHONE ENCOUNTER
Patient is not on any medications-no list to print  Last progress note printed and placed with form    Form placed in providers bin for review and signature if appropriate  Nevaeh Colon MA on 8/8/2022 at 10:01 AM

## 2022-08-12 ENCOUNTER — PATIENT OUTREACH (OUTPATIENT)
Dept: CARE COORDINATION | Facility: CLINIC | Age: 87
End: 2022-08-12

## 2022-08-12 ASSESSMENT — ACTIVITIES OF DAILY LIVING (ADL): DEPENDENT_IADLS:: INDEPENDENT

## 2022-08-12 NOTE — PROGRESS NOTES
Clinic Care Coordination Contact  [facility] [[email or web address]Voicemail    Referral Source: PCP  Clinical Data: Care Coordinator Outreach  Outreach attempted x 1.  Left message on Ivy's (patient's daughter) voicemail with call back information and requested return call.  Plan: Care Coordinator will try to reach patient again in 10 business days.    Faby Bruno RN Care Coordination   [facility] Sumanth Villalba  Email: [email or web address]  Phone: [phone]

## 2022-08-15 ENCOUNTER — TELEPHONE (OUTPATIENT)
Dept: FAMILY MEDICINE | Facility: OTHER | Age: 87
End: 2022-08-15

## 2022-08-15 NOTE — TELEPHONE ENCOUNTER
Forms/Letter Request    Type of form/letter: standing  orders    Have you been seen for this request: N/A    Do we have the form/letter: Yes: Team B form  bin    When is form/letter needed by:  asap    How would you like the form/letter returned: Fax    Patient Notified form requests are processed in 3-5 business days:No    Fax [phone]

## 2022-08-16 ENCOUNTER — MEDICAL CORRESPONDENCE (OUTPATIENT)
Dept: HEALTH INFORMATION MANAGEMENT | Facility: CLINIC | Age: 87
End: 2022-08-16

## 2022-08-18 ENCOUNTER — DOCUMENTATION ONLY (OUTPATIENT)
Dept: OTHER | Facility: CLINIC | Age: 87
End: 2022-08-18

## 2022-08-31 ENCOUNTER — PATIENT OUTREACH (OUTPATIENT)
Dept: CARE COORDINATION | Facility: CLINIC | Age: 87
End: 2022-08-31

## 2022-08-31 ASSESSMENT — ACTIVITIES OF DAILY LIVING (ADL): DEPENDENT_IADLS:: INDEPENDENT

## 2022-08-31 NOTE — PROGRESS NOTES
Clinic Care Coordination Contact  Care Team Conversations    RN called and spoke to patient's daughter Ivy. Ivy reports the patient is now in a memory care facility. She will not be going back to her previous place of living. Ivy reports patient has been in the memory care unit for about a week and is having a hard time with it. Patient wants to go home.     RN CC explained to daughter that once patient is in a memory care unit at a long term care facility, CC usually closes patient to care coordination. Daughter thinks that is OK. She said patient will be there long term and will likely not leave there. Daughter will reach out to CC if she has any needs.     RN CC will do no further outreaches at this time.     Faby Bruno RN Care Coordination   [facility] Sumanth Walls  Email: [email or web address]  Phone: [phone]

## 2024-03-28 NOTE — PROGRESS NOTES
Assessment & Plan     Abdominal pain, generalized/Age-related cognitive decline  Pt has been at the emergency department since the last vist and has been started on antibiotics for a UTI but continues to complain about her abdominal pain and nausea. She has been nearly out of her 10 day antibiotic prescribed on day 3 and does not realize she is not taking the medications right. Several attempts to get home health on board in the past have been unsuccessful. I did file an vulnerable adult report on the patient as she failed to respond to mutiple phone calls and due to concerns about medication mismanagement. Her 6CIT test today is boderline and it is established clearly that she has been struggling to keep up with her medications especially. Did discuss that it may not be safe to continue to drive. She might qualify for home health services if she gave up driving especially with her memory on decline.  I did talk to the care coordinator to get her set up with community med management services through the [facility].will discontinue all meds except metoprolol for now .  Recheck labs to ensure stability of her blood counts and electrolytes due to her persistant symptoms of black stools, nausea and abdominal pain which I suspect are mostly due to medication over use. Follow up in 1 month for close monitoring  - Basic metabolic panel  (Ca, Cl, CO2, Creat, Gluc, K, Na, BUN); Future  - UA with Microscopic reflex to Culture - lab collect; Future  - CBC with platelets; Future  - CBC with platelets  - UA with Microscopic reflex to Culture - lab collect  - Basic metabolic panel  (Ca, Cl, CO2, Creat, Gluc, K, Na, BUN)  - Urine Microscopic    Diarrhea, unspecified type  - Occult blood stool      Heidi Inman MD  [facility]    Jason Macias is a 88 year old who presents for the following health issues  accompanied by her daughter. Amanda    \[facility]\""     ED/UC Followup:    Facility:  [facility]  "  Date of visit: 3/29/2022  Reason for visit: UTI  Current Status: not improved, still having dysuria       Diarrhea  Onset/Duration: 1 week   Description:       Consistency of stool: runny and black        Blood in stool: no       Number of loose stools past 24 hours: 4  Progression of Symptoms: worsening  Accompanying signs and symptoms:       Fever: no       Nausea/Vomiting: YES       Abdominal pain: YES       Weight loss: YES       Episodes of constipation: no  History   Ill contacts: no  Recent use of antibiotics: YES  Recent travels: no  Recent medication-new or changes(Rx or OTC): no  Precipitating or alleviating factors: None  Therapies tried and outcome: none    Annual Wellness Visit    Patient has been advised of split billing requirements and indicates understanding: Yes     Are you in the first 12 months of your Medicare Part B coverage?  No    Physical Health:    In general, how would you rate your overall physical health? poor    Outside of work, how many days during the week do you exercise?none    Outside of work, approximately how many minutes a day do you exercise?not applicable    If you drink alcohol do you typically have >3 drinks per day or >7 drinks per week? No    Do you usually eat at least 4 servings of fruit and vegetables a day, include whole grains & fiber and avoid regularly eating high fat or \"junk\" foods? NO    Do you have any problems taking medications regularly? No    Do you have any side effects from medications? possiblly sick from antibiotic     Needs assistance for the following daily activities: money management and taking medicine    Which of the following safety concerns are present in your home?  none identified     Hearing impairment: No    In the past 6 months, have you been bothered by leaking of urine? yes    Mental Health:    In general, how would you rate your overall mental or emotional health? good  PHQ-2 Score:      Do you feel safe in your environment? Yes    Have " "you ever done Advance Care Planning? (For example, a Health Directive, POLST, or a discussion with a medical provider or your loved ones about your wishes)? No, advance care planning information given to patient to review.  Advanced care planning was discussed at today's visit.    Fall risk:  Fallen 2 or more times in the past year?: No  Any fall with injury in the past year?: No    Cognitive Screenin) Repeat 3 items (Leader, Season, Table)    2) Clock draw: ABNORMAL showed 12:00   3) 3 item recall: Recalls NO objects   Results: 0 items recalled: PROBABLE COGNITIVE IMPAIRMENT, **INFORM PROVIDER**      Do you have sleep apnea, excessive snoring or daytime drowsiness?: no    Current providers sharing in care for this patient include:   Patient Care Team:  Heidi Inman MD as PCP - General (Family Practice)  Bridger Freeman PA-C as Assigned PCP    Patient has been advised of split billing requirements and indicates understanding: Yes    Review of Systems   Constitutional, HEENT, cardiovascular, pulmonary, GI, , musculoskeletal, neuro, skin, endocrine and psych systems are negative, except as otherwise noted.      Objective    /68   Pulse 119   Temp 97.6  F (36.4  C) (Temporal)   Resp 18   Ht 1.585 m (5' 2.4\")   Wt 47.6 kg (105 lb)   SpO2 99%   BMI 18.96 kg/m    Body mass index is 18.96 kg/m .  Physical Exam   GENERAL: healthy, alert and no distress  NECK: no adenopathy, no asymmetry, masses, or scars and thyroid normal to palpation  RESP: lungs clear to auscultation - no rales, rhonchi or wheezes  CV: regular rate and rhythm, normal S1 S2, no S3 or S4, no murmur, click or rub, no peripheral edema and peripheral pulses strong  ABDOMEN: soft, nontender, no hepatosplenomegaly, no masses and bowel sounds normal  MS: no gross musculoskeletal defects noted, no edema  NEURO: Normal strength and tone, mentation intact and speech normal      " PAST MEDICAL HISTORY:  No pertinent past medical history